# Patient Record
Sex: FEMALE | Race: WHITE | Employment: UNEMPLOYED | URBAN - METROPOLITAN AREA
[De-identification: names, ages, dates, MRNs, and addresses within clinical notes are randomized per-mention and may not be internally consistent; named-entity substitution may affect disease eponyms.]

---

## 2021-09-25 ENCOUNTER — HOSPITAL ENCOUNTER (EMERGENCY)
Facility: HOSPITAL | Age: 61
Discharge: HOME | End: 2021-09-25
Attending: EMERGENCY MEDICINE
Payer: MEDICAID

## 2021-09-25 ENCOUNTER — APPOINTMENT (OUTPATIENT)
Dept: CT IMAGING | Facility: HOSPITAL | Age: 61
End: 2021-09-25
Attending: EMERGENCY MEDICINE
Payer: MEDICAID

## 2021-09-25 VITALS
TEMPERATURE: 98 F | OXYGEN SATURATION: 95 % | DIASTOLIC BLOOD PRESSURE: 67 MMHG | WEIGHT: 178 LBS | RESPIRATION RATE: 18 BRPM | SYSTOLIC BLOOD PRESSURE: 118 MMHG | HEART RATE: 65 BPM

## 2021-09-25 DIAGNOSIS — K29.00 ACUTE GASTRITIS WITHOUT HEMORRHAGE, UNSPECIFIED GASTRITIS TYPE: Primary | ICD-10-CM

## 2021-09-25 DIAGNOSIS — K44.9 HIATAL HERNIA: ICD-10-CM

## 2021-09-25 LAB
ALBUMIN SERPL-MCNC: 3.9 G/DL (ref 3.4–5)
ALP LIVER SERPL-CCNC: 99 U/L
ALT SERPL-CCNC: 20 U/L
ANION GAP SERPL CALC-SCNC: 6 MMOL/L (ref 0–18)
AST SERPL-CCNC: 18 U/L (ref 15–37)
BASOPHILS # BLD AUTO: 0.02 X10(3) UL (ref 0–0.2)
BASOPHILS NFR BLD AUTO: 0.4 %
BILIRUB DIRECT SERPL-MCNC: 0.3 MG/DL (ref 0–0.2)
BILIRUB SERPL-MCNC: 1.1 MG/DL (ref 0.1–2)
BUN BLD-MCNC: 10 MG/DL (ref 7–18)
BUN/CREAT SERPL: 11.8 (ref 10–20)
CALCIUM BLD-MCNC: 9 MG/DL (ref 8.5–10.1)
CHLORIDE SERPL-SCNC: 113 MMOL/L (ref 98–112)
CO2 SERPL-SCNC: 27 MMOL/L (ref 21–32)
CREAT BLD-MCNC: 0.85 MG/DL
DEPRECATED RDW RBC AUTO: 46.5 FL (ref 35.1–46.3)
EOSINOPHIL # BLD AUTO: 0.01 X10(3) UL (ref 0–0.7)
EOSINOPHIL NFR BLD AUTO: 0.2 %
ERYTHROCYTE [DISTWIDTH] IN BLOOD BY AUTOMATED COUNT: 13.6 % (ref 11–15)
GLUCOSE BLD-MCNC: 143 MG/DL (ref 70–99)
HCT VFR BLD AUTO: 38 %
HGB BLD-MCNC: 12.6 G/DL
IMM GRANULOCYTES # BLD AUTO: 0.01 X10(3) UL (ref 0–1)
IMM GRANULOCYTES NFR BLD: 0.2 %
LIPASE SERPL-CCNC: 145 U/L (ref 73–393)
LYMPHOCYTES # BLD AUTO: 0.99 X10(3) UL (ref 1–4)
LYMPHOCYTES NFR BLD AUTO: 19.3 %
MCH RBC QN AUTO: 30.7 PG (ref 26–34)
MCHC RBC AUTO-ENTMCNC: 33.2 G/DL (ref 31–37)
MCV RBC AUTO: 92.5 FL
MONOCYTES # BLD AUTO: 0.23 X10(3) UL (ref 0.1–1)
MONOCYTES NFR BLD AUTO: 4.5 %
NEUTROPHILS # BLD AUTO: 3.87 X10 (3) UL (ref 1.5–7.7)
NEUTROPHILS # BLD AUTO: 3.87 X10(3) UL (ref 1.5–7.7)
NEUTROPHILS NFR BLD AUTO: 75.4 %
OSMOLALITY SERPL CALC.SUM OF ELEC: 304 MOSM/KG (ref 275–295)
PLATELET # BLD AUTO: 217 10(3)UL (ref 150–450)
POTASSIUM SERPL-SCNC: 3.8 MMOL/L (ref 3.5–5.1)
PROT SERPL-MCNC: 7.5 G/DL (ref 6.4–8.2)
RBC # BLD AUTO: 4.11 X10(6)UL
SODIUM SERPL-SCNC: 146 MMOL/L (ref 136–145)
WBC # BLD AUTO: 5.1 X10(3) UL (ref 4–11)

## 2021-09-25 PROCEDURE — 96376 TX/PRO/DX INJ SAME DRUG ADON: CPT

## 2021-09-25 PROCEDURE — 74177 CT ABD & PELVIS W/CONTRAST: CPT | Performed by: EMERGENCY MEDICINE

## 2021-09-25 PROCEDURE — 85025 COMPLETE CBC W/AUTO DIFF WBC: CPT | Performed by: EMERGENCY MEDICINE

## 2021-09-25 PROCEDURE — C9113 INJ PANTOPRAZOLE SODIUM, VIA: HCPCS | Performed by: EMERGENCY MEDICINE

## 2021-09-25 PROCEDURE — 96375 TX/PRO/DX INJ NEW DRUG ADDON: CPT

## 2021-09-25 PROCEDURE — 99285 EMERGENCY DEPT VISIT HI MDM: CPT

## 2021-09-25 PROCEDURE — 83690 ASSAY OF LIPASE: CPT | Performed by: EMERGENCY MEDICINE

## 2021-09-25 PROCEDURE — 96374 THER/PROPH/DIAG INJ IV PUSH: CPT

## 2021-09-25 PROCEDURE — 80076 HEPATIC FUNCTION PANEL: CPT | Performed by: EMERGENCY MEDICINE

## 2021-09-25 PROCEDURE — 96372 THER/PROPH/DIAG INJ SC/IM: CPT

## 2021-09-25 PROCEDURE — 80048 BASIC METABOLIC PNL TOTAL CA: CPT | Performed by: EMERGENCY MEDICINE

## 2021-09-25 RX ORDER — ONDANSETRON 2 MG/ML
INJECTION INTRAMUSCULAR; INTRAVENOUS
Status: COMPLETED
Start: 2021-09-25 | End: 2021-09-25

## 2021-09-25 RX ORDER — MORPHINE SULFATE 4 MG/ML
4 INJECTION, SOLUTION INTRAMUSCULAR; INTRAVENOUS ONCE
Status: COMPLETED | OUTPATIENT
Start: 2021-09-25 | End: 2021-09-25

## 2021-09-25 RX ORDER — ONDANSETRON 2 MG/ML
4 INJECTION INTRAMUSCULAR; INTRAVENOUS ONCE
Status: COMPLETED | OUTPATIENT
Start: 2021-09-25 | End: 2021-09-25

## 2021-09-25 RX ORDER — METOCLOPRAMIDE HYDROCHLORIDE 5 MG/ML
10 INJECTION INTRAMUSCULAR; INTRAVENOUS ONCE
Status: COMPLETED | OUTPATIENT
Start: 2021-09-25 | End: 2021-09-25

## 2021-09-25 RX ORDER — TRAMADOL HYDROCHLORIDE 50 MG/1
TABLET ORAL EVERY 6 HOURS PRN
Qty: 10 TABLET | Refills: 0 | Status: SHIPPED | OUTPATIENT
Start: 2021-09-25 | End: 2021-10-07

## 2021-09-25 RX ORDER — DICYCLOMINE HCL 20 MG
20 TABLET ORAL 4 TIMES DAILY PRN
Qty: 14 TABLET | Refills: 0 | Status: SHIPPED | OUTPATIENT
Start: 2021-09-25 | End: 2021-10-07

## 2021-09-25 RX ORDER — OMEPRAZOLE 20 MG/1
20 CAPSULE, DELAYED RELEASE ORAL
COMMUNITY

## 2021-09-25 RX ORDER — TRAMADOL HYDROCHLORIDE 50 MG/1
50 TABLET ORAL ONCE
Status: COMPLETED | OUTPATIENT
Start: 2021-09-25 | End: 2021-09-25

## 2021-09-25 RX ORDER — ONDANSETRON 4 MG/1
4 TABLET, ORALLY DISINTEGRATING ORAL EVERY 4 HOURS PRN
Qty: 10 TABLET | Refills: 0 | Status: SHIPPED | OUTPATIENT
Start: 2021-09-25 | End: 2021-10-07

## 2021-09-25 RX ORDER — PROMETHAZINE HYDROCHLORIDE 25 MG/1
25 SUPPOSITORY RECTAL EVERY 6 HOURS PRN
Qty: 20 SUPPOSITORY | Refills: 0 | Status: SHIPPED | OUTPATIENT
Start: 2021-09-25 | End: 2021-10-07

## 2021-09-25 RX ORDER — DICYCLOMINE HYDROCHLORIDE 10 MG/ML
20 INJECTION INTRAMUSCULAR ONCE
Status: COMPLETED | OUTPATIENT
Start: 2021-09-25 | End: 2021-09-25

## 2021-09-25 NOTE — ED INITIAL ASSESSMENT (HPI)
Pt arrived via EMS c/o abd pain + n/v, hx hiatal hernia. Pt states that she has hx hernia repair x10 years ago, and has upcoming appt with surgeon for evaluation for surgery because she feels mesh moving around.  EMS reported that pt had episode of vomiting

## 2021-09-25 NOTE — ED PROVIDER NOTES
Patient Seen in: [facility] Emergency Department    History   Patient presents with:  Abdomen/Flank Pain  Nausea/Vomiting/Diarrhea      HPI    79-year-old female presents the ER with complaint of nausea vomiting and abdominal pain.   Patient with a p and any equipment used during my examination was cleaned with super sani-cloth germicidal wipes following the exam.     Physical Exam  Vitals and nursing note reviewed. Constitutional:       Appearance: She is well-developed.    HENT:      Head: Normoceph within normal limits   LIPASE - Normal   CBC WITH DIFFERENTIAL WITH PLATELET    Narrative: The following orders were created for panel order CBC With Differential With Platelet.   Procedure                               Abnormality         Status vomiting. Patient abdomen soft nontender nondistended. Patient blood work within normal limits.   Patient discharged home with Zofran instructed to follow-up with her cardiothoracic surgeon as scheduled    Condition upon leaving the department: Stable

## 2021-09-25 NOTE — ED PROVIDER NOTES
Patient began having vomiting and pain after discharge. She is now doing better after Reglan, Zofran, Protonix, morphine. CT was completed and shows large hiatal hernia with sequelae as well as the displaced mesh that patient has already known about.   Pa

## 2021-09-27 ENCOUNTER — APPOINTMENT (OUTPATIENT)
Dept: GENERAL RADIOLOGY | Facility: HOSPITAL | Age: 61
DRG: 326 | End: 2021-09-27
Attending: THORACIC SURGERY (CARDIOTHORACIC VASCULAR SURGERY)
Payer: MEDICAID

## 2021-09-27 ENCOUNTER — HOSPITAL ENCOUNTER (INPATIENT)
Facility: HOSPITAL | Age: 61
LOS: 10 days | Discharge: HOME | DRG: 326 | End: 2021-10-07
Attending: EMERGENCY MEDICINE | Admitting: HOSPITALIST
Payer: MEDICAID

## 2021-09-27 ENCOUNTER — APPOINTMENT (OUTPATIENT)
Dept: CT IMAGING | Facility: HOSPITAL | Age: 61
DRG: 326 | End: 2021-09-27
Attending: EMERGENCY MEDICINE
Payer: MEDICAID

## 2021-09-27 ENCOUNTER — ANESTHESIA EVENT (OUTPATIENT)
Dept: SURGERY | Facility: HOSPITAL | Age: 61
DRG: 326 | End: 2021-09-27
Payer: MEDICAID

## 2021-09-27 ENCOUNTER — ANESTHESIA (OUTPATIENT)
Dept: SURGERY | Facility: HOSPITAL | Age: 61
DRG: 326 | End: 2021-09-27
Payer: MEDICAID

## 2021-09-27 DIAGNOSIS — K31.89 GASTRIC VOLVULUS: Primary | ICD-10-CM

## 2021-09-27 DIAGNOSIS — K22.3 PERFORATION ESOPHAGUS: ICD-10-CM

## 2021-09-27 PROBLEM — R79.89 AZOTEMIA: Status: ACTIVE | Noted: 2021-09-27

## 2021-09-27 PROCEDURE — 0WQF0ZZ REPAIR ABDOMINAL WALL, OPEN APPROACH: ICD-10-PCS | Performed by: SURGERY

## 2021-09-27 PROCEDURE — 71260 CT THORAX DX C+: CPT | Performed by: EMERGENCY MEDICINE

## 2021-09-27 PROCEDURE — 99254 IP/OBS CNSLTJ NEW/EST MOD 60: CPT | Performed by: SURGERY

## 2021-09-27 PROCEDURE — 0DU607Z SUPPLEMENT STOMACH WITH AUTOLOGOUS TISSUE SUBSTITUTE, OPEN APPROACH: ICD-10-PCS | Performed by: SURGERY

## 2021-09-27 PROCEDURE — 0DN60ZZ RELEASE STOMACH, OPEN APPROACH: ICD-10-PCS | Performed by: SURGERY

## 2021-09-27 PROCEDURE — 49561 REPAIR INCISIONAL HERNIA,STRANG: CPT | Performed by: SURGERY

## 2021-09-27 PROCEDURE — 0W9G0ZZ DRAINAGE OF PERITONEAL CAVITY, OPEN APPROACH: ICD-10-PCS | Performed by: SURGERY

## 2021-09-27 PROCEDURE — 0DHA0UZ INSERTION OF FEEDING DEVICE INTO JEJUNUM, OPEN APPROACH: ICD-10-PCS | Performed by: SURGERY

## 2021-09-27 PROCEDURE — 43830 GSTRST OPEN WO CONSTJ TUBE: CPT | Performed by: SURGERY

## 2021-09-27 PROCEDURE — 0D9600Z DRAINAGE OF STOMACH WITH DRAINAGE DEVICE, OPEN APPROACH: ICD-10-PCS | Performed by: SURGERY

## 2021-09-27 PROCEDURE — 99223 1ST HOSP IP/OBS HIGH 75: CPT | Performed by: HOSPITALIST

## 2021-09-27 PROCEDURE — 0DB60ZZ EXCISION OF STOMACH, OPEN APPROACH: ICD-10-PCS | Performed by: SURGERY

## 2021-09-27 PROCEDURE — 71045 X-RAY EXAM CHEST 1 VIEW: CPT | Performed by: THORACIC SURGERY (CARDIOTHORACIC VASCULAR SURGERY)

## 2021-09-27 PROCEDURE — 0DJ08ZZ INSPECTION OF UPPER INTESTINAL TRACT, VIA NATURAL OR ARTIFICIAL OPENING ENDOSCOPIC: ICD-10-PCS | Performed by: THORACIC SURGERY (CARDIOTHORACIC VASCULAR SURGERY)

## 2021-09-27 PROCEDURE — 0BQT0ZZ REPAIR DIAPHRAGM, OPEN APPROACH: ICD-10-PCS | Performed by: THORACIC SURGERY (CARDIOTHORACIC VASCULAR SURGERY)

## 2021-09-27 PROCEDURE — 0WB80ZZ EXCISION OF CHEST WALL, OPEN APPROACH: ICD-10-PCS | Performed by: THORACIC SURGERY (CARDIOTHORACIC VASCULAR SURGERY)

## 2021-09-27 PROCEDURE — 74160 CT ABDOMEN W/CONTRAST: CPT | Performed by: EMERGENCY MEDICINE

## 2021-09-27 RX ORDER — MORPHINE SULFATE 10 MG/ML
6 INJECTION, SOLUTION INTRAMUSCULAR; INTRAVENOUS EVERY 10 MIN PRN
Status: DISCONTINUED | OUTPATIENT
Start: 2021-09-27 | End: 2021-09-27 | Stop reason: HOSPADM

## 2021-09-27 RX ORDER — MORPHINE SULFATE 4 MG/ML
2 INJECTION, SOLUTION INTRAMUSCULAR; INTRAVENOUS EVERY 10 MIN PRN
Status: DISCONTINUED | OUTPATIENT
Start: 2021-09-27 | End: 2021-09-27 | Stop reason: HOSPADM

## 2021-09-27 RX ORDER — MORPHINE SULFATE 4 MG/ML
4 INJECTION, SOLUTION INTRAMUSCULAR; INTRAVENOUS EVERY 10 MIN PRN
Status: DISCONTINUED | OUTPATIENT
Start: 2021-09-27 | End: 2021-09-27 | Stop reason: HOSPADM

## 2021-09-27 RX ORDER — PROCHLORPERAZINE EDISYLATE 5 MG/ML
5 INJECTION INTRAMUSCULAR; INTRAVENOUS EVERY 8 HOURS PRN
Status: DISCONTINUED | OUTPATIENT
Start: 2021-09-27 | End: 2021-10-01

## 2021-09-27 RX ORDER — LIDOCAINE HYDROCHLORIDE 10 MG/ML
INJECTION, SOLUTION EPIDURAL; INFILTRATION; INTRACAUDAL; PERINEURAL AS NEEDED
Status: DISCONTINUED | OUTPATIENT
Start: 2021-09-27 | End: 2021-09-27 | Stop reason: SURG

## 2021-09-27 RX ORDER — ONDANSETRON 2 MG/ML
4 INJECTION INTRAMUSCULAR; INTRAVENOUS ONCE
Status: COMPLETED | OUTPATIENT
Start: 2021-09-27 | End: 2021-09-27

## 2021-09-27 RX ORDER — MORPHINE SULFATE 4 MG/ML
4 INJECTION, SOLUTION INTRAMUSCULAR; INTRAVENOUS EVERY 2 HOUR PRN
Status: DISCONTINUED | OUTPATIENT
Start: 2021-09-27 | End: 2021-10-07

## 2021-09-27 RX ORDER — MORPHINE SULFATE 4 MG/ML
4 INJECTION, SOLUTION INTRAMUSCULAR; INTRAVENOUS EVERY 2 HOUR PRN
Status: DISCONTINUED | OUTPATIENT
Start: 2021-09-27 | End: 2021-10-04

## 2021-09-27 RX ORDER — ROCURONIUM BROMIDE 10 MG/ML
INJECTION, SOLUTION INTRAVENOUS AS NEEDED
Status: DISCONTINUED | OUTPATIENT
Start: 2021-09-27 | End: 2021-09-27 | Stop reason: SURG

## 2021-09-27 RX ORDER — CALCIUM GLUCONATE 94 MG/ML
INJECTION, SOLUTION INTRAVENOUS AS NEEDED
Status: DISCONTINUED | OUTPATIENT
Start: 2021-09-27 | End: 2021-09-27 | Stop reason: SURG

## 2021-09-27 RX ORDER — ONDANSETRON 2 MG/ML
4 INJECTION INTRAMUSCULAR; INTRAVENOUS EVERY 6 HOURS PRN
Status: DISCONTINUED | OUTPATIENT
Start: 2021-09-27 | End: 2021-10-07

## 2021-09-27 RX ORDER — ONDANSETRON 2 MG/ML
4 INJECTION INTRAMUSCULAR; INTRAVENOUS ONCE AS NEEDED
Status: DISCONTINUED | OUTPATIENT
Start: 2021-09-27 | End: 2021-09-27 | Stop reason: HOSPADM

## 2021-09-27 RX ORDER — HYDROMORPHONE HYDROCHLORIDE 1 MG/ML
1 INJECTION, SOLUTION INTRAMUSCULAR; INTRAVENOUS; SUBCUTANEOUS ONCE
Status: COMPLETED | OUTPATIENT
Start: 2021-09-27 | End: 2021-09-27

## 2021-09-27 RX ORDER — HYDROMORPHONE HYDROCHLORIDE 1 MG/ML
0.4 INJECTION, SOLUTION INTRAMUSCULAR; INTRAVENOUS; SUBCUTANEOUS EVERY 5 MIN PRN
Status: DISCONTINUED | OUTPATIENT
Start: 2021-09-27 | End: 2021-09-27 | Stop reason: HOSPADM

## 2021-09-27 RX ORDER — HYDROCODONE BITARTRATE AND ACETAMINOPHEN 5; 325 MG/1; MG/1
1 TABLET ORAL AS NEEDED
Status: DISCONTINUED | OUTPATIENT
Start: 2021-09-27 | End: 2021-09-27 | Stop reason: HOSPADM

## 2021-09-27 RX ORDER — MORPHINE SULFATE 4 MG/ML
2 INJECTION, SOLUTION INTRAMUSCULAR; INTRAVENOUS EVERY 2 HOUR PRN
Status: DISCONTINUED | OUTPATIENT
Start: 2021-09-27 | End: 2021-10-07

## 2021-09-27 RX ORDER — MORPHINE SULFATE 2 MG/ML
2 INJECTION, SOLUTION INTRAMUSCULAR; INTRAVENOUS EVERY 2 HOUR PRN
Status: DISCONTINUED | OUTPATIENT
Start: 2021-09-27 | End: 2021-10-04

## 2021-09-27 RX ORDER — MORPHINE SULFATE 4 MG/ML
4 INJECTION, SOLUTION INTRAMUSCULAR; INTRAVENOUS ONCE
Status: COMPLETED | OUTPATIENT
Start: 2021-09-27 | End: 2021-09-27

## 2021-09-27 RX ORDER — SODIUM CHLORIDE 9 MG/ML
INJECTION, SOLUTION INTRAVENOUS CONTINUOUS PRN
Status: DISCONTINUED | OUTPATIENT
Start: 2021-09-27 | End: 2021-09-27 | Stop reason: SURG

## 2021-09-27 RX ORDER — ONDANSETRON 2 MG/ML
INJECTION INTRAMUSCULAR; INTRAVENOUS AS NEEDED
Status: DISCONTINUED | OUTPATIENT
Start: 2021-09-27 | End: 2021-09-27 | Stop reason: SURG

## 2021-09-27 RX ORDER — MENTHOL 5.8 MG/1
1 LOZENGE ORAL DAILY
COMMUNITY
End: 2021-10-07

## 2021-09-27 RX ORDER — NALOXONE HYDROCHLORIDE 0.4 MG/ML
80 INJECTION, SOLUTION INTRAMUSCULAR; INTRAVENOUS; SUBCUTANEOUS AS NEEDED
Status: DISCONTINUED | OUTPATIENT
Start: 2021-09-27 | End: 2021-09-27 | Stop reason: HOSPADM

## 2021-09-27 RX ORDER — MORPHINE SULFATE 4 MG/ML
6 INJECTION, SOLUTION INTRAMUSCULAR; INTRAVENOUS EVERY 2 HOUR PRN
Status: DISCONTINUED | OUTPATIENT
Start: 2021-09-27 | End: 2021-10-07

## 2021-09-27 RX ORDER — HALOPERIDOL 5 MG/ML
0.25 INJECTION INTRAMUSCULAR ONCE AS NEEDED
Status: DISCONTINUED | OUTPATIENT
Start: 2021-09-27 | End: 2021-09-27 | Stop reason: HOSPADM

## 2021-09-27 RX ORDER — MORPHINE SULFATE 2 MG/ML
1 INJECTION, SOLUTION INTRAMUSCULAR; INTRAVENOUS EVERY 2 HOUR PRN
Status: DISCONTINUED | OUTPATIENT
Start: 2021-09-27 | End: 2021-10-04

## 2021-09-27 RX ORDER — PHENYLEPHRINE HCL 10 MG/ML
VIAL (ML) INJECTION AS NEEDED
Status: DISCONTINUED | OUTPATIENT
Start: 2021-09-27 | End: 2021-09-27 | Stop reason: SURG

## 2021-09-27 RX ORDER — HYDROMORPHONE HYDROCHLORIDE 1 MG/ML
0.2 INJECTION, SOLUTION INTRAMUSCULAR; INTRAVENOUS; SUBCUTANEOUS EVERY 5 MIN PRN
Status: DISCONTINUED | OUTPATIENT
Start: 2021-09-27 | End: 2021-09-27 | Stop reason: HOSPADM

## 2021-09-27 RX ORDER — SODIUM CHLORIDE, SODIUM LACTATE, POTASSIUM CHLORIDE, CALCIUM CHLORIDE 600; 310; 30; 20 MG/100ML; MG/100ML; MG/100ML; MG/100ML
INJECTION, SOLUTION INTRAVENOUS CONTINUOUS
Status: DISCONTINUED | OUTPATIENT
Start: 2021-09-27 | End: 2021-09-27 | Stop reason: HOSPADM

## 2021-09-27 RX ORDER — HYDROCODONE BITARTRATE AND ACETAMINOPHEN 5; 325 MG/1; MG/1
2 TABLET ORAL AS NEEDED
Status: DISCONTINUED | OUTPATIENT
Start: 2021-09-27 | End: 2021-09-27 | Stop reason: HOSPADM

## 2021-09-27 RX ORDER — DEXAMETHASONE SODIUM PHOSPHATE 4 MG/ML
VIAL (ML) INJECTION AS NEEDED
Status: DISCONTINUED | OUTPATIENT
Start: 2021-09-27 | End: 2021-09-27 | Stop reason: SURG

## 2021-09-27 RX ORDER — SODIUM CHLORIDE, SODIUM LACTATE, POTASSIUM CHLORIDE, CALCIUM CHLORIDE 600; 310; 30; 20 MG/100ML; MG/100ML; MG/100ML; MG/100ML
INJECTION, SOLUTION INTRAVENOUS CONTINUOUS
Status: DISCONTINUED | OUTPATIENT
Start: 2021-09-27 | End: 2021-10-04

## 2021-09-27 RX ORDER — HYDROMORPHONE HYDROCHLORIDE 1 MG/ML
0.6 INJECTION, SOLUTION INTRAMUSCULAR; INTRAVENOUS; SUBCUTANEOUS EVERY 5 MIN PRN
Status: DISCONTINUED | OUTPATIENT
Start: 2021-09-27 | End: 2021-09-27 | Stop reason: HOSPADM

## 2021-09-27 RX ORDER — PROCHLORPERAZINE EDISYLATE 5 MG/ML
5 INJECTION INTRAMUSCULAR; INTRAVENOUS ONCE AS NEEDED
Status: DISCONTINUED | OUTPATIENT
Start: 2021-09-27 | End: 2021-09-27 | Stop reason: HOSPADM

## 2021-09-27 RX ADMIN — LIDOCAINE HYDROCHLORIDE 50 MG: 10 INJECTION, SOLUTION EPIDURAL; INFILTRATION; INTRACAUDAL; PERINEURAL at 19:51:00

## 2021-09-27 RX ADMIN — SODIUM CHLORIDE, SODIUM LACTATE, POTASSIUM CHLORIDE, CALCIUM CHLORIDE: 600; 310; 30; 20 INJECTION, SOLUTION INTRAVENOUS at 17:11:00

## 2021-09-27 RX ADMIN — CALCIUM GLUCONATE 1 G: 94 INJECTION, SOLUTION INTRAVENOUS at 19:09:00

## 2021-09-27 RX ADMIN — LIDOCAINE HYDROCHLORIDE 50 MG: 10 INJECTION, SOLUTION EPIDURAL; INFILTRATION; INTRACAUDAL; PERINEURAL at 16:31:00

## 2021-09-27 RX ADMIN — SODIUM CHLORIDE: 9 INJECTION, SOLUTION INTRAVENOUS at 18:20:00

## 2021-09-27 RX ADMIN — SODIUM CHLORIDE: 9 INJECTION, SOLUTION INTRAVENOUS at 19:55:00

## 2021-09-27 RX ADMIN — DEXAMETHASONE SODIUM PHOSPHATE 8 MG: 4 MG/ML VIAL (ML) INJECTION at 16:42:00

## 2021-09-27 RX ADMIN — SODIUM CHLORIDE: 9 INJECTION, SOLUTION INTRAVENOUS at 16:55:00

## 2021-09-27 RX ADMIN — SODIUM CHLORIDE: 9 INJECTION, SOLUTION INTRAVENOUS at 17:56:00

## 2021-09-27 RX ADMIN — ROCURONIUM BROMIDE 100 MG: 10 INJECTION, SOLUTION INTRAVENOUS at 16:31:00

## 2021-09-27 RX ADMIN — CALCIUM GLUCONATE 1 G: 94 INJECTION, SOLUTION INTRAVENOUS at 19:49:00

## 2021-09-27 RX ADMIN — SODIUM CHLORIDE, SODIUM LACTATE, POTASSIUM CHLORIDE, CALCIUM CHLORIDE: 600; 310; 30; 20 INJECTION, SOLUTION INTRAVENOUS at 18:31:00

## 2021-09-27 RX ADMIN — PHENYLEPHRINE HCL 200 MCG: 10 MG/ML VIAL (ML) INJECTION at 16:35:00

## 2021-09-27 RX ADMIN — CALCIUM GLUCONATE 1 G: 94 INJECTION, SOLUTION INTRAVENOUS at 16:42:00

## 2021-09-27 RX ADMIN — SODIUM CHLORIDE: 9 INJECTION, SOLUTION INTRAVENOUS at 16:44:00

## 2021-09-27 RX ADMIN — PHENYLEPHRINE HCL 200 MCG: 10 MG/ML VIAL (ML) INJECTION at 16:42:00

## 2021-09-27 RX ADMIN — ONDANSETRON 4 MG: 2 INJECTION INTRAMUSCULAR; INTRAVENOUS at 19:37:00

## 2021-09-27 RX ADMIN — ROCURONIUM BROMIDE 20 MG: 10 INJECTION, SOLUTION INTRAVENOUS at 18:22:00

## 2021-09-27 NOTE — ANESTHESIA PROCEDURE NOTES
Airway  Date/Time: 9/27/2021 4:34 PM  Urgency: Elective    Airway not difficult    General Information and Staff    Patient location during procedure: OR  Anesthesiologist: Teto Kaur MD  Performed: anesthesiologist     Indications and Patient Conditi

## 2021-09-27 NOTE — PROGRESS NOTES
General surgery consultation    Chart is reviewed    I was called by the emergency room to evaluate a 69-year-old female who was seen 2 days ago with history of large paraesophageal hernia.   She was discharged home returns now with midepigastric pain and i

## 2021-09-27 NOTE — ANESTHESIA PREPROCEDURE EVALUATION
Anesthesia PreOp Note    HPI:     Carolynn Coburn is a 57year old female who presents for preoperative consultation requested by: Shubham Sierra MD    Date of Surgery: 9/27/2021    Procedure(s):  EXPLORATORY LAPAROTOMY  THORACOTOMY  Indication: bowel o mg, Intravenous, Q2H PRN, Taylor Hanks MD   Or  morphINE sulfate (PF) 2 MG/ML injection 2 mg, 2 mg, Intravenous, Q2H PRN, Taylor Hanks MD   Or  morphINE sulfate (PF) 4 MG/ML injection 4 mg, 4 mg, Intravenous, Q2H PRN, MD Deep Wright file      Frequency of Social Gatherings with Friends and Family: Not on file      Attends Jehovah's witness Services: Not on file      Active Member of Clubs or Organizations: Not on file      Attends Club or Organization Meetings: Not on file      Marital Status Pulmonary - negative ROS and normal exam    breath sounds clear to auscultation  (-) asthma, shortness of breath, recent URI, sleep apnea  Cardiovascular - negative ROS and normal exam  Exercise tolerance: good  (-) pacemaker, hypertension, valvular proble

## 2021-09-27 NOTE — ANESTHESIA PROCEDURE NOTES
Peripheral IV  Date/Time: 9/27/2021 4:38 PM  Inserted by: Daniel Wynne MD    Placement  Needle size: 16 G  Laterality: right  Location: forearm  Site prep: alcohol  Technique: anatomical landmarks  Attempts: 1

## 2021-09-27 NOTE — CONSULTS
[facility]    PATIENT'S NAME: Jodie Nickerson [facility]   ATTENDING PHYSICIAN: Sterling Farrell MD   CONSULTING PHYSICIAN: Justin Garcia MD   PATIENT ACCOUNT#:   [id number]    LOCATION:  [address]  MEDICAL RECORD #:   [id number]       DATE OF Tenderness in the epigastrium. No diffuse peritonitis. EXTREMITIES:  Without lymphedema. LABORATORY DATA:  Laboratory values and CT scans reviewed. 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     PLAN:  Exploratory laparo

## 2021-09-27 NOTE — PROGRESS NOTES
Pt admitted to room from ED. Oriented to room and call light use. Pain upon moving from cart to bed, then resting comfortably in bed. On 4L NC. Ambulating to bathroom with staff, voided. IVF infusing. Plan for surgery this evening.  at bedside.  Cathryn Ashford

## 2021-09-27 NOTE — CONSULTS
Thoracic Surgery Consult      Name: Shi Monday   Age: 57year old   Sex: female. MRN: [id number]    Reason for Consultation: periesophageal hernia with volvulus    Consulting Physician: Ra Palomares    Subjective:      Chief Complaint: \ as needed for Nausea., Disp: 10 tablet, Rfl: 0  •  traMADol 50 MG Oral Tab, Take 1-2 tablets ( mg total) by mouth every 6 (six) hours as needed for Pain., Disp: 10 tablet, Rfl: 0  •  dicyclomine 20 MG Oral Tab, Take 1 tablet (20 mg total) by mouth 4 .0 09/27/2021 09:53 AM    MCV 91.2 09/27/2021 09:53 AM     Lab Results   Component Value Date/Time     09/27/2021 09:53 AM    K 3.8 09/27/2021 09:53 AM     09/27/2021 09:53 AM    CO2 26.0 09/27/2021 09:53 AM    BUN 26 (H) 09/27/2021 09:5 located adjacent to   the left lateral margin of the paraesophageal hiatal hernia component; unfavorable interval change with progressive distention of the paraesophageal hiatal hernia component; organoaxial malrotation of the stomach is again identified a volvulus/perforation.  Specifically, there has been progressive distention of the paraesophageal gastric component with multiple new foci of free intraperitoneal air adjacent to the distal esophagus and throughout the upper abdomen. Ervin Dial is also small free intraperitoneal air adjacent to distal esophagus and throughout upper abdomen. Patient previously had hiatal hernia repair 10 years ago. Since last labor day, she noted abdominal pain and vomiting with solid foods and occasionally liquids.  She was e setting of recurrent hiatal hernia and gastric volvulus. Ms. Rosita Espino understands and agrees to proceed.     Prabhjot Stephen MD  Thoracic Surgery  Pager [phone]

## 2021-09-27 NOTE — H&P
[facility]    PATIENT'S NAME: [facility]   ATTENDING PHYSICIAN: Shantell Fraser MD   PATIENT ACCOUNT#:   [id number]    LOCATION:  [place] [address]  MEDICAL RECORD #:   [id number]       YOB: 1960  ADMISSION DATE:       09/27 fluids, Protonix, IV Zosyn. She will be admitted to the hospital for further management. PAST MEDICAL HISTORY:  Sliding periesophageal hernia, status post surgical repair a long time ago, recent worsening of her hernia.   She has been evaluated by Abel Shaikh possible distal esophageal perforation and free air in the abdomen and underlying migration of an old mesh. The patient will be admitted to surgical floor.   IV fluids, n.p.o., monitor her hemodynamic status, IV antibiotics and Protonix, obtain general arya

## 2021-09-27 NOTE — ED QUICK NOTES
Orders for admission, patient is aware of plan and ready to go upstairs. Any questions, please call ED KINA Siddiqi  at extension [phone].    Type of COVID test sent: Rapid  COVID Suspicion level: Low    Titratable drug(s) infusin.9 NS  Rate:    LOC at time o

## 2021-09-27 NOTE — ANESTHESIA PROCEDURE NOTES
Arterial Line  Performed by: Pedro Deleno MD  Authorized by: Pedro Deleon MD     General Information and Staff    Procedure Start:  9/27/2021 4:28 PM  Procedure End:  9/27/2021 4:32 PM  Anesthesiologist:  Pedro Deleon MD  Performed By:  Brenden Wadsworth

## 2021-09-27 NOTE — ED INITIAL ASSESSMENT (HPI)
Izzy Farfan is here for evaluation of abdominal pain, nausea, vomiting, and inability to tolerate fluids. Has hiatal hernia and is scheduled for surgery in [place].

## 2021-09-28 ENCOUNTER — APPOINTMENT (OUTPATIENT)
Dept: GENERAL RADIOLOGY | Facility: HOSPITAL | Age: 61
DRG: 326 | End: 2021-09-28
Attending: THORACIC SURGERY (CARDIOTHORACIC VASCULAR SURGERY)
Payer: MEDICAID

## 2021-09-28 PROCEDURE — 71045 X-RAY EXAM CHEST 1 VIEW: CPT | Performed by: THORACIC SURGERY (CARDIOTHORACIC VASCULAR SURGERY)

## 2021-09-28 PROCEDURE — 99255 IP/OBS CONSLTJ NEW/EST HI 80: CPT | Performed by: INTERNAL MEDICINE

## 2021-09-28 PROCEDURE — 99233 SBSQ HOSP IP/OBS HIGH 50: CPT | Performed by: HOSPITALIST

## 2021-09-28 RX ORDER — HEPARIN SODIUM 5000 [USP'U]/ML
5000 INJECTION, SOLUTION INTRAVENOUS; SUBCUTANEOUS EVERY 12 HOURS
Status: DISCONTINUED | OUTPATIENT
Start: 2021-09-28 | End: 2021-10-07

## 2021-09-28 NOTE — ED PROVIDER NOTES
Patient Seen in: [facility] ER      History   Patient presents with:  Abdomen/Flank Pain    Stated Complaint: abd pain hx hernia -- seen here fri    Subjective:   HPI    57year old female with known hiatal hernia who has acutely worsening abd brenda not toxic-appearing or diaphoretic. HENT:      Head: Normocephalic and atraumatic. Eyes:      Conjunctiva/sclera: Conjunctivae normal.      Pupils: Pupils are equal, round, and reactive to light.    Cardiovascular:      Rate and Rhythm: Normal rate and for the following components:    ISTAT Sodium 147 (*)     ISTAT Hematocrit 31 (*)     ISTAT Glucose 136 (*)     ISTAT Blood Gas pH 7.29 (*)     ISTAT Blood Gas TCO2 21 (*)     ISTAT Blood Gas HCO3 19.7 (*)     All other components within normal limits   CB CHEST+ABDOMEN (ALL CNTRST ONLY) (CPT=71260/29094)    Result Date: 9/27/2021  CONCLUSION:  1. Large mixed paraesophageal and sliding type hiatal hernia is again identified with organoaxial malrotation of the stomach.   There has been unfavorable interval juana 9/27/2021 at 10:19 PM     Finalized by (CST): Guru Andersen MD on 9/27/2021 at 10:21 PM            Radiology exams  Viewed and reviewed by myself and findings discussed with patient including need for follow up    Critical Care:  I spent a total of 30 minute 1010)   iopamidol (ISOVUE-M) 76 % injection 100 mL (80 mL Intravenous Given 9/27/21 1059)   sodium chloride 0.9% IV bolus 1,500 mL (1,500 mL Intravenous Handoff 9/27/21 1358)   Piperacillin Sod-Tazobactam So (ZOSYN) 3.375 g in dextrose 5% 100 mL IVPB-ADDV

## 2021-09-28 NOTE — BRIEF OP NOTE
Pre-Operative Diagnosis: GASTRIC VOLVULUS     Post-Operative Diagnosis: GASTRIC VOLVULUS, necrotic fundus and cardia, extensive matted adhesions      Procedure Performed:   EXPLORATORY LAPAROTOMY, extensive lysis of matted dense adhesions modifier 22, part

## 2021-09-28 NOTE — OPERATIVE REPORT
[facility]    PATIENT'S NAME: Bowen GARCIA   ATTENDING PHYSICIAN: Kevin Coburn MD   OPERATING PHYSICIAN: Lauren Thompson MD   PATIENT ACCOUNT#:   [id number]    LOCATION:  [address] [place] [address] #:   [id number]       DATE OF exact location. I did perform an esophagoscopy and confirmed that there was no perforation of her esophagus-- the perforation was approximately 5 to 7 cm onto the stomach past the GE junction.   The stomach was stuck up in the chest with a shortened esopha ribs and through the costal cartilages and then through the interspace. I extended the thoracotomy posteriorly. A Finochietto retractor was placed.   I then began to take down the diaphragm leaving a cuff of diaphragm along the edge for later reconstructi By Janeth Leahy MD  d: 09/27/2021 20:13:09  t: 09/28/2021 02:55:07  [facility] [id number]  BASIL/

## 2021-09-28 NOTE — PLAN OF CARE
Problem: Patient Centered Care  Goal: Patient preferences are identified and integrated in the patient's plan of care  Description: Interventions:  - What would you like us to know as we care for you?  Lives in [place] and came in [place] for a visit therapy as ordered  Outcome: Progressing     Problem: GASTROINTESTINAL - ADULT  Goal: Minimal or absence of nausea and vomiting  Description: INTERVENTIONS:  - Maintain adequate hydration with IV or PO as ordered and tolerated  - Nasogastric tube to low in renal function maintained  Description: INTERVENTIONS:  - Monitor labs and assess for signs and symptoms of volume excess or deficit  - Monitor intake, output and patient weight  - Monitor urine specific gravity, serum osmolarity and serum sodium as indica for shaving  - Use soft bristle tooth brush  - Limit straining and forceful nose blowing  Outcome: Progressing     Problem: Patient/Family Goals  Goal: Patient/Family Long Term Goal  Description: Patient's Long Term Goal: to be able to go back home in [facility] Term Goal: to be able to go back home in [place]    Interventions:  - monitor vital signs  -strictly measure intake and output  -give pain medicine to control pain as needed  - See additional Care Plan goals for specific interventions  Outcome: Not Prog patient after the bolus and only came up the BP in 90 systolic and stay in low side after few minutes and started on a low dose of levophed per Dr. Candi Calabrese order and read back by phone and carried out. VS kept monitored .  Then later she desat on room air

## 2021-09-28 NOTE — PROGRESS NOTES
[facility]    Progress Note    Sravan Zuluaga Patient Status:  Inpatient    1960 MRN [id number]   Location [facility] 2W/SW Attending Dafne Lui MD   Hosp Day # 1 PCP PHYSICIAN NONSTAFF       Subjective:   Janette Gaytan (TTW=[id number])    Result Date: 9/27/2021  CONCLUSION:  1. Large mixed paraesophageal and sliding type hiatal hernia is again identified with organoaxial malrotation of the stomach.   There has been unfavorable interval change since CT examination of 2 da Efe Moran MD on 9/28/2021 at 7:25 AM     Finalized by (CST): Michell Mccoy MD on 9/28/2021 at 7:28 AM          XR CHEST AP PORTABLE  (CPT=71045)    Result Date: 9/27/2021  CONCLUSION:   Postoperative changes of left thoracotomy with left chest tube in p

## 2021-09-28 NOTE — CONSULTS
[facility]    Report of Consultation    Shi Monday Patient Status:  Inpatient    1960 MRN [id number]   Location [facility] 2W/SW Attending Aliyah Myers MD   Hosp Day # 1 PCP PHYSICIAN NONSTAFF     Date of Admissio Use: Never used    Alcohol use: Yes      Comment: occ. Drug use: Never    Allergies/Medications: Allergies: No Known Allergies  Multiple Vitamins-Iron (QC DAILY MULTIVITAMINS/IRON) Oral Tab, Take 1 tablet by mouth daily.   omeprazole 20 MG Oral Capsule or rebound  Postsurgical changes   Lymphadenopathy:     She has no cervical adenopathy. Neurological: She is oriented to person, place, and time. No sensory deficit or motor deficit. Skin: Skin is dry.    Psychiatric: Her behavior is normal.       Resul There is a retroaortic left renal vein. 6. Partially imaged bilateral breast implants. 7. Chronic-appearing moderate to severe T8 as well as mild to moderate T7 and L1 vertebral body compression fractures. 8. Lesser incidental findings as above.    Results staff   D/w pt and family             Galina Vencesin.  Brigid Rucekr MD  9/28/2021

## 2021-09-28 NOTE — DIETARY NOTE
ADULT NUTRITION INITIAL ASSESSMENT    Pt is at high nutrition risk. Pt does not meet malnutrition criteria. RECOMMENDATIONS TO MD:  Will follow for enteral nutriton start, po intake future start, education as appropriate.      ADMITTING DIAGNOSIS: Intake: NPO  Intake Meeting Needs: No but J-tube feeds to start soon.    Percent Meals Eaten (last 3 days)     Date/Time Percent Meals Eaten (%)    09/28/21 0000 0 %    09/28/21 0534 0 %         Food Allergies: No Known Food Allergies (NKFA)  Cultural/Eth ESTIMATED NUTRITION NEEDS: Dosing wt: 80  kg  Calories: 2518-7740  calories/day (20-30calories per kg Actual body wt (ABW))  Protein:   grams protein/day (1.5-2.0 grams protein per kg Ideal body wt (IBW))  Fluid needs: ~2400 ml (30 ml/kg) + losse

## 2021-09-28 NOTE — OPERATIVE REPORT
[facility]    PATIENT'S NAME: Josefa Meléndez JOSE   ATTENDING PHYSICIAN: Markus Arita MD   OPERATING PHYSICIAN: Pepe Silva MD   PATIENT ACCOUNT#:   [id number]    LOCATION:  [address] [place] [address] RECORD #:   [id number]       DATE OF BIRTH: fully mobilized and there is a large perforation along the fundus. The esophagus does not appear to be involved. I elected to resect a portion of the fundus and cardia using serial application of the DEAN stapler.   We made careful observation not to encro

## 2021-09-28 NOTE — PROGRESS NOTES
[facility]    Progress Note    Gerda Codding Patient Status:  Inpatient    1960 MRN [id number]   Location [facility] 2W/SW Attending Emmie Ceron MD   Hosp Day # 1 PCP PHYSICIAN NONSTAFF     Assessment and Plan: 82 14 95 % — —   09/28/21 0450 98/73 — — 85 15 96 % — —   09/28/21 0440 112/81 — — 90 20 94 % — —   09/28/21 0430 116/83 — — 89 26 91 % — —   09/28/21 0420 113/78 — — 83 14 94 % — —   09/28/21 0410 99/78 — — 83 14 96 % — —   09/28/21 0400 107/69 98.2 °F (3 09/27/21 2109 111/87 — — 88 15 98 % — —   09/27/21 2059 (!) 116/92 — — 86 16 97 % — —   09/27/21 2049 107/90 — — 80 13 96 % — —   09/27/21 2039 (!) 113/92 — — 78 17 96 % — —   09/27/21 2029 (!) 118/99 — — 75 17 93 % — —   09/27/21 2024 — — — — — 96 % — — --    Output (mL) (Gastrostomy/Enterostomy Gastrostomy 1 18 Fr.) -- 60 --    Output (mL) (NG/OG Tube Nasogastric Left nostril) -- 0 --    Drains  --  225  --    Output (mL) (Closed/Suction Drain 1 Abdomen Bulb ) -- 225 --    Chest Tube  --  362  140    Out history of prior/remote hiatal hernia repair, this finding could relate to displaced mesh material.  Alternatively, consider a chronic postoperative seroma and/or hematoma. 3. Small fat containing umbilical and midline supraumbilical ventral hernias.  4. Ch

## 2021-09-29 ENCOUNTER — APPOINTMENT (OUTPATIENT)
Dept: GENERAL RADIOLOGY | Facility: HOSPITAL | Age: 61
DRG: 326 | End: 2021-09-29
Attending: THORACIC SURGERY (CARDIOTHORACIC VASCULAR SURGERY)
Payer: MEDICAID

## 2021-09-29 PROCEDURE — 99233 SBSQ HOSP IP/OBS HIGH 50: CPT | Performed by: HOSPITALIST

## 2021-09-29 PROCEDURE — 71045 X-RAY EXAM CHEST 1 VIEW: CPT | Performed by: THORACIC SURGERY (CARDIOTHORACIC VASCULAR SURGERY)

## 2021-09-29 PROCEDURE — 99233 SBSQ HOSP IP/OBS HIGH 50: CPT | Performed by: INTERNAL MEDICINE

## 2021-09-29 RX ORDER — FLUCONAZOLE 2 MG/ML
200 INJECTION, SOLUTION INTRAVENOUS EVERY 24 HOURS
Status: DISCONTINUED | OUTPATIENT
Start: 2021-09-29 | End: 2021-10-07

## 2021-09-29 NOTE — PLAN OF CARE
Problem: Patient Centered Care  Goal: Patient preferences are identified and integrated in the patient's plan of care  Description: Interventions:  - Provide timely, complete, and accurate information to patient/family  - Incorporate patient and family k Evaluate effectiveness of vasoactive medications to optimize hemodynamic stability  - Monitor arterial and/or venous puncture sites for bleeding and/or hematoma  - Assess quality of pulses, skin color and temperature  - Assess for signs of decreased corona nutritional consult as needed  - Evaluate fluid balance  Outcome: Progressing  Goal: Maintains or returns to baseline bowel function  Description: INTERVENTIONS:  - Assess bowel function  - Maintain adequate hydration with IV or PO as ordered and tolerated Instruct patient on fluid and nutrition restrictions as appropriate  Outcome: Progressing     Problem: SKIN/TISSUE INTEGRITY - ADULT  Goal: Incision(s), wounds(s) or drain site(s) healing without S/S of infection  Description: INTERVENTIONS:  - Assess and

## 2021-09-29 NOTE — PROGRESS NOTES
Patient doing well with her PCA morphine dose and sleeping arouseable. Continuous end tidal carbon dioxide monitoring not applicable at this time because patient requiring high concentration of oxygen at this time since start of my shift.  I ask the RT if t

## 2021-09-29 NOTE — DIETARY NOTE
ADULT NUTRITION UPDATE NOTE    Pt is at high nutrition risk. Pt does not meet malnutrition criteria. RECOMMENDATIONS TO MD:  Trickle tube feeds initiated per orders. Will advance once orders received. ADMITTING DIAGNOSIS:   Gastric volvulus [K31. polymeric formula to start. FOOD/NUTRITION RELATED HISTORY:  Appetite: Good until recently.    Intake: NPO  Intake Meeting Needs: No but J-tube feeds start  Percent Meals Eaten (last 3 days)     Date/Time Percent Meals Eaten (%)    09/28/21 0000 0 % oz)  09/25/21 : 80.7 kg (178 lb)    NUTRITION DIAGNOSIS/PROBLEM:  Altered GI function related to altered GI function post GI surgery and partial gastrectomy as evidenced by GI surgery as above  NUTRITION DIAGNOSIS PROGRESS:  No Improvement (continue)

## 2021-09-29 NOTE — PHYSICAL THERAPY NOTE
PHYSICAL THERAPY EVALUATION - INPATIENT     Room Number: 235/794-U  Evaluation Date: 9/29/2021  Type of Evaluation: Initial   Physician Order: PT Eval and Treat    Presenting Problem: exploratory lapartomy thoracotomy  Reason for Therapy: Mobility Dysfunc Short Form. Research supports that patients with this level of impairment may benefit from Home with home health PT. RN aware of patient status post session.     Patient will benefit from continued IP PT services to address these deficits in preparation fo Good  Static Standing: Fair +  Dynamic Standin Yadiel Brooks,Fl 2 '6-Clicks' INPATIENT SHORT FORM - BASIC MOBILITY  How much difficulty does the patient currently have. ..  -   Turning over in bed (including adjusting bedclothes, sheets and blankets)?: RANDI Mares

## 2021-09-29 NOTE — PROGRESS NOTES
[facility]    Progress Note    Kashmir Alfred Patient Status:  Inpatient    1960 MRN [id number]   Location [facility] 2W/SW Attending Elaine Salter MD   [facility] Day # 2 PCP PHYSICIAN NONSTAFF     Assessment and Plan: 2200 118/65 — — 95 19 91 %   09/28/21 2030 — — — 96 17 92 %   09/28/21 2000 100/71 98.7 °F (37.1 °C) Temporal 109 21 93 %   09/28/21 1950 — — — 95 14 93 %   09/28/21 1900 102/71 — — 100 20 93 %   09/28/21 1800 96/70 — — 104 19 92 %   09/28/21 1700 104/78 — 30 --    Output (mL) (NG/OG Tube Nasogastric Left nostril) 0 1150 --    Drains  225  70  --    Output (mL) (Closed/Suction Drain 1 Abdomen Bulb ) 225 70 --    Chest Tube  362  310  --    Output (mL) (Chest Tube 1 Left Pleural 24 Fr.) 362 310 --    Total Ou displaced mesh material.  Alternatively, consider a chronic postoperative seroma and/or hematoma. 3. Small fat containing umbilical and midline supraumbilical ventral hernias. 4. Cholelithiasis. 5. There is a retroaortic left renal vein.  6. Partially image

## 2021-09-29 NOTE — PLAN OF CARE
Problem: Patient Centered Care  Goal: Patient preferences are identified and integrated in the patient's plan of care  Description: Interventions:  - What would you like us to know as we care for you?  From [place] with her daughter studying at [employer]  - signs, rhythm, and trends  - Monitor for bleeding, hypotension and signs of decreased cardiac output  - Evaluate effectiveness of vasoactive medications to optimize hemodynamic stability  - Monitor arterial and/or venous puncture sites for bleeding and/or nonpharmacologic comfort measures as appropriate  - Advance diet as tolerated, if ordered  - Obtain nutritional consult as needed  - Evaluate fluid balance  Outcome: Progressing  Goal: Maintains or returns to baseline bowel function  Description: INTERVENT urine output, blood pressure (other measures as available)  - Encourage oral intake as appropriate  - Instruct patient on fluid and nutrition restrictions as appropriate  Outcome: Progressing     Problem: SKIN/TISSUE INTEGRITY - ADULT  Goal: Incision(s), w

## 2021-09-29 NOTE — PROGRESS NOTES
General surgery addendum    We will add Diflucan 200 mg IV daily for yeast found on peritoneal culture

## 2021-09-29 NOTE — PROGRESS NOTES
Thoracic Surgery Progress Note     Julien Conner is a 57year old female. MRN [id number]. Admitted 9/27/2021    [facility] EVENTS:     No acute events overnight.    O2 sats dropped to 88%, currently on 8L HFNC, Other VSS  WBC up to 10.3 from 5.7 09/29/2021    TP 5.5 09/29/2021     09/29/2021     09/29/2021    MG 1.9 09/29/2021         Assessment/Plan:     57year old female with gastric volvulus and perforated gastric fundus s/p EGD, left thoracoabdominal incision, resection of left

## 2021-09-29 NOTE — PROGRESS NOTES
[facility]    Progress Note    Shavonne Holcomb Patient Status:  Inpatient    1960 MRN [id number]   Location [facility] 2W/SW Attending Savita Walsh MD   Hosp Day # 2 PCP PHYSICIAN NONSTAFF       Subjective:   Cinthia Sports (CPT=71045)    Result Date: 9/29/2021  CONCLUSION: Post thoracotomy. Stable position of drain in the base of the left hemithorax. Stable small pleural effusions. Stable small pulmonary opacities likely representing atelectasis.    Dictated by (CST): Mya re: treatment plan, work up and coordination of care.

## 2021-09-29 NOTE — PROGRESS NOTES
[facility]    Progress Note    Starr London Patient Status:  Inpatient    1960 MRN [id number]   Location [facility] 2W/SW Attending Maynor Sanchez MD   [facility] Day # 2 PCP PHYSICIAN NONSTAFF     Subjective:     Constit  (H) 09/29/2021     (H) 09/29/2021     09/27/2021    MG 1.9 09/29/2021       CT CHEST+ABDOMEN (ALL CNTRST ONLY) (CPT=71260/43258)    Result Date: 9/27/2021  CONCLUSION:  1.  Large mixed paraesophageal and sliding type hiatal hernia is a left hemithorax. Stable small pleural effusions. Stable small pulmonary opacities likely representing atelectasis.    Dictated by (CST): Maira Shaw MD on 9/29/2021 at 9:02 AM     Finalized by (CST): Maira Shaw MD on 9/29/2021 at 9:06 AM          XR JACKLYN

## 2021-09-29 NOTE — PLAN OF CARE
Patient A&O x4. HFNC 8L, saturating well. PCA Morphine continued per order. IVF infusing. PRN Zofran administered for nausea. All other scheduled meds administered per [facility]. Abdominal dressings changed: ABD Pads/ Tape applied to incision.  J- Tube, [address] [facility]  Description: INTERVENTIONS:  - Assess pt frequently for physical needs  - Identify cognitive and physical deficits and behaviors that affect risk of falls.   - [place] fall precautions as indicated by assessment.  - Educate pt/family on patient sa Provide Smoking Cessation handout, if applicable  - Encourage broncho-pulmonary hygiene including cough, deep breathe, Incentive Spirometry  - Assess the need for suctioning and perform as needed  - Assess and instruct to report SOB or any respiratory diff infection  Description: INTERVENTIONS:  - Assess and document risk factors for pressure ulcer development  - Assess and document skin integrity  - Assess and document dressing/incision, wound bed, drain sites and surrounding tissue  - Implement wound care

## 2021-09-30 PROCEDURE — 99232 SBSQ HOSP IP/OBS MODERATE 35: CPT | Performed by: INTERNAL MEDICINE

## 2021-09-30 PROCEDURE — 99233 SBSQ HOSP IP/OBS HIGH 50: CPT | Performed by: HOSPITALIST

## 2021-09-30 NOTE — PROGRESS NOTES
[facility]    Progress Note    Marleni Pelletier Patient Status:  Inpatient    1960 MRN [id number]   Location [facility] 2W/SW Attending Homar Dinero MD   Hosp Day # 3 PCP PHYSICIAN NONSTAFF       Subjective:   Marguerite Anders position of drain in the base of the left hemithorax. Stable small pleural effusions. Stable small pulmonary opacities likely representing atelectasis.    Dictated by (CST): Bean Dawson MD on 9/29/2021 at 9:02 AM     Finalized by (CST): Bean Dawson MD on

## 2021-09-30 NOTE — PLAN OF CARE
Patient A&O x4. HFNC 8-10L, down to 77% without oxygen. PCA Morphine continued per order. IVF infusing. All other scheduled meds administered per [facility]. Abdominal dressings changed: ABD Pads/ Tape applied to incision.  J- Tube, JADEN Drain and G-Tube w/ split g Identify cognitive and physical deficits and behaviors that affect risk of falls.   - [place] fall precautions as indicated by assessment.  - Educate pt/family on patient safety including physical limitations  - Instruct pt to call for assistance with act hygiene including cough, deep breathe, Incentive Spirometry  - Assess the need for suctioning and perform as needed  - Assess and instruct to report SOB or any respiratory difficulty  - Respiratory Therapy support as indicated  - Manage/alleviate anxiety replacement as ordered  - Monitor response to electrolyte replacements, including rhythm and repeat lab results as appropriate  - Fluid restriction as ordered  - Instruct patient on fluid and nutrition restrictions as appropriate  Outcome: Osmar Manuel mobilization of patient  - Hold pressure on venipuncture sites to achieve adequate hemostasis  - Assess for signs and symptoms of internal bleeding  - Monitor lab trends  - Patient is to report abnormal signs of bleeding to staff  - Avoid use of toothpicks

## 2021-09-30 NOTE — PROGRESS NOTES
[facility]     Progress Note        Akua Abebe Patient Status:  Inpatient    1960 MRN [id number]   Location [facility] 2W/SW Attending Naty Luevano MD   [address] Day # 3 PCP PHYSICIAN NONSTAFF       Subjective:   Rupa Donnelly Daily  morphINE sulfate (PF) 4 MG/ML injection 2 mg, 2 mg, Intravenous, Q2H PRN   Or  morphINE sulfate (PF) 4 MG/ML injection 4 mg, 4 mg, Intravenous, Q2H PRN   Or  morphINE sulfate (PF) 4 MG/ML injection 6 mg, 6 mg, Intravenous, Q2H PRN        Continuous drainage of intra-abdominal abscess. Underwent resection of thoracic paraesophageal masslike area and J-tube placement  -Continue IV antibiotic therapy at this time  -IV hydration  -Pain management  -NG tube to be discontinued today.   Increase tube feedin

## 2021-09-30 NOTE — PLAN OF CARE
Patient alert and oriented this shift, able to make needs known. No complaints of pain, no pca boluses used this shift, continues at continuous rate.  Dressing remain clean dry and intact with exception of gauze at kieran drain sight has slight serosanguino effectiveness of ordered antiemetic medications  - Provide nonpharmacologic comfort measures as appropriate  - Advance diet as tolerated, if ordered  - Obtain nutritional consult as needed  - Evaluate fluid balance  Outcome: Progressing     Problem: SKIN/T

## 2021-09-30 NOTE — PHYSICAL THERAPY NOTE
PHYSICAL THERAPY TREATMENT NOTE - INPATIENT     Room Number: 530/732-P       Presenting Problem: exploratory lapartomy thoracotomy    Problem List  Principal Problem:    Gastric volvulus  Active Problems:    Azotemia    Perforation esophagus      PHYSICAL Home     PLAN  PT Treatment Plan: Bed mobility; Body mechanics;  Patient education; Gait training; Stair training; Transfer training; Balance training; Strengthening    SUBJECTIVE  \"I can do it myself\"    OBJECTIVE  Precautions:  (abdominal surgery )    W level: independent with none      Goal #2  Current Status CGA   Goal #3 Patient is able to ambulate 100 feet with assist device: none at assistance level: independent   Goal #3   Current Status 2ft with CGA   Goal #4 Patient will negotiate 2 stairs/one cur

## 2021-09-30 NOTE — PROGRESS NOTES
Thoracic Surgery Progress Note     Gerda Kyle is a 57year old female. MRN [id number]. Admitted 9/27/2021    [address] [facility] EVENTS:     No acute events overnight.    O2 sats dropped to 88%, currently on 8L HFNC, Other VSS  WBC up to 10.3 from 5.7 09/30/2021    BILT 0.8 09/30/2021    TP 5.1 09/30/2021    AST 80 09/30/2021    ALT 74 09/30/2021    MG 1.9 09/30/2021    PHOS 1.3 09/30/2021         Assessment/Plan:     57year old female with gastric volvulus and perforated gastric fundus s/p EGD, left t

## 2021-09-30 NOTE — DIETARY NOTE
ADULT NUTRITION UPDATE NOTE    Pt is at high nutrition risk. Pt does not meet malnutrition criteria. RECOMMENDATIONS TO MD:  Tube Feeding goal:  Promote: 80 ml/hr.      ADMITTING DIAGNOSIS:   Gastric volvulus [K31.89]  Perforation esophagus [K22.3] increased to 40 ml/hr. FOOD/NUTRITION RELATED HISTORY:  Appetite: Good until recently.    Intake: NPO  Intake Meeting Needs: No but J-tube feeds advancing  Percent Meals Eaten (last 3 days)     Date/Time Percent Meals Eaten (%)    09/28/21 0000 0 %    09/ Wt Readings from Last 10 Encounters:  09/27/21 : 80.7 kg (177 lb 14.6 oz)  09/25/21 : 80.7 kg (178 lb)    NUTRITION DIAGNOSIS/PROBLEM:  Altered GI function related to altered GI function post GI surgery and partial gastrectomy as evidenced by GI surger status    Jareth Chao RD, [address] [facility], [address] [place] [address]  ([id number])

## 2021-09-30 NOTE — PROGRESS NOTES
[facility]    Progress Note    Sravan Zuluaga Patient Status:  Inpatient    1960 MRN [id number]   Location [facility] 2W/SW Attending Ricardo Mcneal MD   [facility] Day # 3 PCP PHYSICIAN NONSTAFF     Assessment and Plan: 16 Fr.) -- 362 --    IV PIGGYBACK  --  400  --    Volume (mL) (Piperacillin Sod-Tazobactam So (ZOSYN) 3.375 g in dextrose 5% 100 mL IVPB-ADDV) -- 300 --    Volume (mL) (Fluconazole in Sodium Chloride (DIFLUCAN) IVPB premix 200 mg) -- 100 --    Total Intake Estevan Phleps MD  9/30/2021

## 2021-10-01 ENCOUNTER — APPOINTMENT (OUTPATIENT)
Dept: GENERAL RADIOLOGY | Facility: HOSPITAL | Age: 61
DRG: 326 | End: 2021-10-01
Attending: THORACIC SURGERY (CARDIOTHORACIC VASCULAR SURGERY)
Payer: MEDICAID

## 2021-10-01 ENCOUNTER — APPOINTMENT (OUTPATIENT)
Dept: GENERAL RADIOLOGY | Facility: HOSPITAL | Age: 61
DRG: 326 | End: 2021-10-01
Attending: SURGERY
Payer: MEDICAID

## 2021-10-01 PROCEDURE — 99233 SBSQ HOSP IP/OBS HIGH 50: CPT | Performed by: HOSPITALIST

## 2021-10-01 PROCEDURE — 99233 SBSQ HOSP IP/OBS HIGH 50: CPT | Performed by: INTERNAL MEDICINE

## 2021-10-01 PROCEDURE — 74018 RADEX ABDOMEN 1 VIEW: CPT | Performed by: SURGERY

## 2021-10-01 PROCEDURE — 71045 X-RAY EXAM CHEST 1 VIEW: CPT | Performed by: THORACIC SURGERY (CARDIOTHORACIC VASCULAR SURGERY)

## 2021-10-01 RX ORDER — METOCLOPRAMIDE 10 MG/1
10 TABLET ORAL EVERY 6 HOURS PRN
Status: DISCONTINUED | OUTPATIENT
Start: 2021-10-01 | End: 2021-10-04

## 2021-10-01 RX ORDER — POTASSIUM CHLORIDE 14.9 MG/ML
20 INJECTION INTRAVENOUS ONCE
Status: COMPLETED | OUTPATIENT
Start: 2021-10-01 | End: 2021-10-01

## 2021-10-01 RX ORDER — METOCLOPRAMIDE HYDROCHLORIDE 5 MG/ML
10 INJECTION INTRAMUSCULAR; INTRAVENOUS EVERY 6 HOURS PRN
Status: DISCONTINUED | OUTPATIENT
Start: 2021-10-01 | End: 2021-10-04

## 2021-10-01 RX ORDER — ECHINACEA PURPUREA EXTRACT 125 MG
1 TABLET ORAL
Status: DISCONTINUED | OUTPATIENT
Start: 2021-10-01 | End: 2021-10-07

## 2021-10-01 NOTE — PROGRESS NOTES
[facility]    Progress Note    Lucille Shoshoni Patient Status:  Inpatient    1960 MRN [id number]   Location [facility] 2W/SW Attending Rolly Ortiz MD   Hosp Day # 4 PCP PHYSICIAN NONSTAFF       Subjective:   Jose Alberto Morin 09/27/2021    MG 1.9 09/30/2021    PHOS 2.1 (L) 10/01/2021       XR CHEST AP PORTABLE  (CPT=71045)    Result Date: 10/1/2021  CONCLUSION:   Stable small bilateral pleural effusions and mild bibasilar opacity which may reflect mild bibasilar atelectasis.   I

## 2021-10-01 NOTE — PHYSICAL THERAPY NOTE
PHYSICAL THERAPY TREATMENT NOTE - INPATIENT     Room Number: 826/381-C       Presenting Problem: exploratory lapartomy thoracotomy    Problem List  Principal Problem:    Gastric volvulus  Active Problems:    Azotemia    Perforation esophagus      PHYSICAL Degree of Impairment: 46.58% has been calculated based on documentation in the [facility] '6 clicks' Inpatient Basic Mobility Short Form.   Research supports that patients with this level of impairment may benefit from Home with home health PT. RN aware of sampson of session/findings; All patient questions and concerns addressed;  Family present    CURRENT GOALS   Goals to be met by: 10/15/21  Patient Goal Patient's self-stated goal is: to go home   Goal #1 Patient is able to demonstrate supine - sit EOB @ level: ind

## 2021-10-01 NOTE — PROGRESS NOTES
Thoracic Surgery Progress Note     Susan Harvey is a 57year old female. MRN [id number]. Admitted 2021    [address] [facility] [address] EVENTS:     Walked today.   No new complaints    Objective:     VITALS:     Temp (24hrs), Av.8 °F (36.6 °C), Min:97.7 ° drainage of intra-abdominal abscess, g-tube, j-tube by general surgery, POD 4. EGD during procedure negative for esophageal perforation. Maintain chest tube to suction-- OK to Ambulate off suction  Maintain O2 sats >90%, wean O2 as tolerated.  Appreciate

## 2021-10-01 NOTE — PROGRESS NOTES
[facility]    Progress Note    Sean Soni Patient Status:  Inpatient    1960 MRN [id number]   Location [facility] 2W/SW Attending Mishel Felix MD   [facility] Day # 4 PCP PHYSICIAN NONSTAFF     Assessment and Plan: NG/GT  362  8099  --    Tube Feeding Flushes (mL) -- 800 --    Intake (mL) (Gastrostomy/Enterostomy Jejunostomy 1 16 Fr.) 362 259 --    IV PIGGYBACK  400  400  --    Volume (mL) (Piperacillin Sod-Tazobactam So (ZOSYN) 3.375 g in dextrose 5% 100 mL IVPB-ADD 10/01/2021 at 8:13 AM                    Stephon Combs MD  10/1/2021

## 2021-10-01 NOTE — PLAN OF CARE
Patient has stated she has nausea. PCA pump stopped.       Problem: Patient Centered Care  Goal: Patient preferences are identified and integrated in the patient's plan of care  Description: Interventions:  - What would you like us to know as we care for yo vital signs, rhythm, and trends  - Monitor for bleeding, hypotension and signs of decreased cardiac output  - Evaluate effectiveness of vasoactive medications to optimize hemodynamic stability  - Monitor arterial and/or venous puncture sites for bleeding a Provide nonpharmacologic comfort measures as appropriate  - Advance diet as tolerated, if ordered  - Obtain nutritional consult as needed  - Evaluate fluid balance  Outcome: Progressing  Goal: Maintains or returns to baseline bowel function  Description: I labs, urine output, blood pressure (other measures as available)  - Encourage oral intake as appropriate  - Instruct patient on fluid and nutrition restrictions as appropriate  Outcome: Progressing

## 2021-10-01 NOTE — PLAN OF CARE
Patient with emesis X1 this shift, was encouraged to slow down with the   icecaps.  Refused antinausea medicine, uneventful night otherwise                                             Problem: SAFETY ADULT - FALL  Goal: Free from fall injury  Description if ordered  - Obtain nutritional consult as needed  - Evaluate fluid balance  Outcome: Progressing

## 2021-10-01 NOTE — PROGRESS NOTES
[facility] - [facility]    Progress Note    Susan Rolling Patient Status:  Inpatient    1960 MRN [id number]   Location [facility] 2W/SW Attending Sherman Choi MD   [facility] Day # 4 PCP PHYSICIAN NONSTAFF     Subjective:     Constit 09/30/2021    AST 80 (H) 09/30/2021    ALT 74 (H) 09/30/2021     09/27/2021    MG 1.9 09/30/2021    PHOS 2.1 (L) 10/01/2021       XR CHEST AP PORTABLE  (CPT=71045)    Result Date: 10/1/2021  CONCLUSION:   Stable small bilateral pleural effusions and

## 2021-10-02 PROCEDURE — 99233 SBSQ HOSP IP/OBS HIGH 50: CPT | Performed by: INTERNAL MEDICINE

## 2021-10-02 PROCEDURE — 99024 POSTOP FOLLOW-UP VISIT: CPT | Performed by: SURGERY

## 2021-10-02 PROCEDURE — 99233 SBSQ HOSP IP/OBS HIGH 50: CPT | Performed by: HOSPITALIST

## 2021-10-02 NOTE — PROGRESS NOTES
[facility]    [facility] Note    Kashmir Alfred Patient Status:  Inpatient    1960 MRN [id number]   Location [facility] 2W/SW Attending Elaine Salter MD   [address] Day # 5 PCP PHYSICIAN NONSTAFF     Assessment and Plan: % —   10/01/21 1600 (!) 131/91 98.4 °F (36.9 °C) Temporal 81 — 93 % —   10/01/21 1500 141/89 — — 95 22 92 % —   10/01/21 1400 126/86 — — 83 20 95 % —   10/01/21 1300 135/81 — — 82 20 93 % —   10/01/21 1200 118/83 97.7 °F (36.5 °C) Temporal 76 22 95 % —   1 10/02/2021     10/02/2021    CO2 32.0 10/02/2021     (H) 10/02/2021    CA 8.1 (L) 10/02/2021    ALB 1.7 (L) 09/30/2021    ALKPHO 52 09/30/2021    BILT 0.8 09/30/2021    TP 5.1 (L) 09/30/2021    AST 80 (H) 09/30/2021    ALT 74 (H) 09/30/2021

## 2021-10-02 NOTE — PLAN OF CARE
Pt up to chair for majority of day, ambulated in hallway x2.      Problem: Patient Centered Care  Goal: Patient preferences are identified and integrated in the patient's plan of care  Description: Interventions:  - Provide timely, complete, and accurate in bleeding, hypotension and signs of decreased cardiac output  - Evaluate effectiveness of vasoactive medications to optimize hemodynamic stability  - Monitor arterial and/or venous puncture sites for bleeding and/or hematoma  - Assess quality of pulses, ski appropriate  - Advance diet as tolerated, if ordered  - Obtain nutritional consult as needed  - Evaluate fluid balance  Outcome: Progressing  Goal: Maintains or returns to baseline bowel function  Description: INTERVENTIONS:  - Assess bowel function  - Lorena Resendiz measures as available)  - Encourage oral intake as appropriate  - Instruct patient on fluid and nutrition restrictions as appropriate  Outcome: Progressing     Problem: SKIN/TISSUE INTEGRITY - ADULT  Goal: Incision(s), wounds(s) or drain site(s) healing wi

## 2021-10-02 NOTE — PROGRESS NOTES
THORACIC SURGERY POST-OPERATIVE PROGRESS NOTE    Subjective: No acute events. Objective:     10/02/21  0900   BP:    Pulse: 83   Resp: 20   Temp:        I/O last 3 completed shifts:   In: 1320 [I.V.:4303; NG/GT:2080; IV PIGGYBACK:400]  Out: 1015 [Urine:3

## 2021-10-02 NOTE — PLAN OF CARE
Problem: Patient Centered Care  Goal: Patient preferences are identified and integrated in the patient's plan of care  Description: Interventions:  - What would you like us to know as we care for you?   - Provide timely, complete, and accurate informatio hypotension and signs of decreased cardiac output  - Evaluate effectiveness of vasoactive medications to optimize hemodynamic stability  - Monitor arterial and/or venous puncture sites for bleeding and/or hematoma  - Assess quality of pulses, skin color an comfort measures as appropriate  - Advance diet as tolerated, if ordered  - Obtain nutritional consult as needed  - Evaluate fluid balance  Outcome: Progressing  Note: Pt had no major complaints of nausea and no emesis throughout night.   Martha small amounts symptoms of volume excess or deficit  - Monitor intake, output and patient weight  - Monitor urine specific gravity, serum osmolarity and serum sodium as indicated or ordered  - Monitor response to interventions for patient's volume status, including labs, shaver for shaving  - Use soft bristle tooth brush  - Limit straining and forceful nose blowing  Outcome: Progressing

## 2021-10-02 NOTE — PROGRESS NOTES
[facility]    Progress Note    Susan Rolling Patient Status:  Inpatient    1960 MRN [id number]   Location [facility] 2W/SW Attending Larry Reed MD   Hosp Day # 5 PCP PHYSICIAN NONSTAFF       Subjective:   Ysabel Conner 10/1/2021  CONCLUSION:  1. Postop changes. 2. No evidence of obstruction. 3. Drains in place. 4. Skin staples. Dictated by (CST): Eric Lockwood MD on 10/01/2021 at 1:02 PM     Finalized by (CST):  Eric Lockwood MD on 10/01/2021 at 1:37 PM

## 2021-10-02 NOTE — PROGRESS NOTES
Pulmonary/Critical Care Follow Up Note    HPI:   Damian Devi is a 57year old female with Patient presents with:  Abdomen/Flank Pain      PCP PHYSICIAN NONSTAFF  Admission Attending Jonah Wilkinson MD    Hospital Day #5    Pain from chest tube  No a (1.676 m), weight 203 lb 12.8 oz (92.4 kg), SpO2 96 %.     Intake/Output Summary (Last 24 hours) at 10/2/2021 1417  Last data filed at 10/2/2021 1200  Gross per 24 hour   Intake 2494 ml   Output 5561 ml   Net -3067 ml     Nad  Sitting in chair  Lung cta  cv

## 2021-10-02 NOTE — PHYSICAL THERAPY NOTE
PHYSICAL THERAPY TREATMENT NOTE - INPATIENT     Room Number: 597/715-T       Presenting Problem: exploratory lapartomy thoracotomy    Problem List  Principal Problem:    Gastric volvulus  Active Problems:    Azotemia    Perforation esophagus      PHYSICAL MOBILITY  How much difficulty does the patient currently have. ..  -   Turning over in bed (including adjusting bedclothes, sheets and blankets)?: A Little   -   Sitting down on and standing up from a chair with arms (e.g., wheelchair, bedside commode, etc. tested    Goal #5 Patient to demonstrate independence with home activity/exercise instructions provided to patient in preparation for discharge.    Goal #5   Current Status In progress

## 2021-10-03 PROCEDURE — 99024 POSTOP FOLLOW-UP VISIT: CPT | Performed by: SURGERY

## 2021-10-03 PROCEDURE — 99232 SBSQ HOSP IP/OBS MODERATE 35: CPT | Performed by: HOSPITALIST

## 2021-10-03 PROCEDURE — 99232 SBSQ HOSP IP/OBS MODERATE 35: CPT | Performed by: INTERNAL MEDICINE

## 2021-10-03 NOTE — PLAN OF CARE
Patient AO x4. Chest tube and michaud catheter removed today. G-Tube capped. Patient ambulated in guidry x2 with minimal assistance, and has been ambulating to and from bathroom with minimal assistance throughout the day. Up in chair for majority of day.  Trans OT/PT consult to assist with strengthening/mobility  - Encourage toileting schedule  Outcome: Progressing     Problem: CARDIOVASCULAR - ADULT  Goal: Maintains optimal cardiac output and hemodynamic stability  Description: INTERVENTIONS:  - Monitor vital si absence of nausea and vomiting  Description: INTERVENTIONS:  - Maintain adequate hydration with IV or PO as ordered and tolerated  - Nasogastric tube to low intermittent suction as ordered  - Evaluate effectiveness of ordered antiemetic medications  - Prov symptoms of volume excess or deficit  - Monitor intake, output and patient weight  - Monitor urine specific gravity, serum osmolarity and serum sodium as indicated or ordered  - Monitor response to interventions for patient's volume status, including labs, blowing  Outcome: Progressing

## 2021-10-03 NOTE — PLAN OF CARE
Patient is alert and oriented x 4, remains on 2L nasal cannula. Fluids infusing. Bowel movement x 1, formed, brown. Excellent urine output. JADEN drain x 1. Chest tube with 90ccs out. G-tube to gravity. J-tube to tube feeding. Denies pain.  Zofran given for na

## 2021-10-03 NOTE — PROGRESS NOTES
[facility]    Progress Note    Luke Ibrahim Patient Status:  Inpatient    1960 MRN [id number]   Location [facility] 2W/SW Attending Pietro Shi MD   Hosp Day # 6 PCP PHYSICIAN NONSTAFF     Subjective:   Chest t

## 2021-10-03 NOTE — PHYSICAL THERAPY NOTE
PHYSICAL THERAPY TREATMENT NOTE - INPATIENT     Room Number: 541/410-L       Presenting Problem: exploratory lapartomy thoracotomy    Problem List  Principal Problem:    Gastric volvulus  Active Problems:    Azotemia    Perforation esophagus      PHYSICAL Activity promotion; Body mechanics; Breathing techniques; Relaxation; Repositioning    BALANCE                                                                                                                       Static Sitting: Good  Dynamic Sitting:  Waqas Ruiz level: independent   Goal #3   Current Status 40ft with rolling walker CGA   Goal #4 Patient will negotiate 2 stairs/one curb w/ assistive device and supervision   Goal #4   Current Status Not tested    Goal #5 Patient to demonstrate independence with home

## 2021-10-03 NOTE — PROGRESS NOTES
Pulmonary/Critical Care Follow Up Note    HPI:   Marilyn Ni is a 57year old female with Patient presents with:  Abdomen/Flank Pain      PCP PHYSICIAN NONSTAFF  Admission Attending Oliva Tidwell MD    Hospital Day #6    Chest tube out  Feeling be height 5' 6\" (1.676 m), weight 203 lb 12.8 oz (92.4 kg), SpO2 95 %.     Intake/Output Summary (Last 24 hours) at 10/3/2021 1514  Last data filed at 10/3/2021 1400  Gross per 24 hour   Intake 3376 ml   Output 5895 ml   Net -2519 ml     NAD  Sitting in chair

## 2021-10-03 NOTE — PROGRESS NOTES
[facility] [place]    [facility] Note    Keisha Isaacs Patient Status:  Inpatient    1960 MRN [id number]   Location [facility] 2W/SW Attending Weston Davalos MD   Hosp Day # 6 PCP PHYSICIAN NONSTAFF     Assessment and Plan: Temporal 89 24 97 %   10/02/21 1100 — — — 88 19 97 %   10/02/21 1000 127/85 — — 81 26 93 %   10/02/21 0900 — — — 83 20 93 %       Intake/Output                 10/01/21 0700 - 10/02/21 0659 10/02/21 0700 - 10/03/21 0659 10/03/21 0700 - 10/04/21 2614 Postop changes. 2. No evidence of obstruction. 3. Drains in place. 4. Skin staples. Dictated by (CST): Alanis Hernandez MD on 10/01/2021 at 1:02 PM     Finalized by (CST):  Alanis Hernandez MD on 10/01/2021 at 1:37 PM                  Nandini Mei

## 2021-10-04 ENCOUNTER — APPOINTMENT (OUTPATIENT)
Dept: PICC SERVICES | Facility: HOSPITAL | Age: 61
DRG: 326 | End: 2021-10-04
Attending: HOSPITALIST
Payer: MEDICAID

## 2021-10-04 PROCEDURE — 99233 SBSQ HOSP IP/OBS HIGH 50: CPT | Performed by: HOSPITALIST

## 2021-10-04 PROCEDURE — 99232 SBSQ HOSP IP/OBS MODERATE 35: CPT | Performed by: INTERNAL MEDICINE

## 2021-10-04 RX ORDER — PROCHLORPERAZINE EDISYLATE 5 MG/ML
10 INJECTION INTRAMUSCULAR; INTRAVENOUS EVERY 6 HOURS PRN
Status: DISCONTINUED | OUTPATIENT
Start: 2021-10-04 | End: 2021-10-07

## 2021-10-04 NOTE — PLAN OF CARE
Zoraida Hsieh is aware of the POC at this time. She is reporting mild nausea today. No emesis. Zofran and compazine given as needed. She is tolerating sips of clears. J-tube feedings continued. Plan for patient to transition to cyclic feedings.        No complai pt/family on patient safety including physical limitations  - Instruct pt to call for assistance with activity based on assessment  - Modify environment to reduce risk of injury  - Provide assistive devices as appropriate  - Consider OT/PT consult to rahel or any respiratory difficulty  - Respiratory Therapy support as indicated  - Manage/alleviate anxiety  - Monitor for signs/symptoms of CO2 retention  Outcome: Progressing     Problem: GASTROINTESTINAL - ADULT  Goal: Minimal or absence of nausea and vomitin Instruct patient on fluid and nutrition restrictions as appropriate  Outcome: Progressing  Goal: Hemodynamic stability and optimal renal function maintained  Description: INTERVENTIONS:  - Monitor labs and assess for signs and symptoms of volume excess or trends  - Patient is to report abnormal signs of bleeding to staff  - Avoid use of toothpicks and dental floss  - Use electric shaver for shaving  - Use soft bristle tooth brush  - Limit straining and forceful nose blowing  Outcome: Progressing     Problem discharge planning if the patient needs post-hospital services based on physician/LIP order or complex needs related to functional status, cognitive ability or social support system  Outcome: Progressing

## 2021-10-04 NOTE — PROGRESS NOTES
[place] [facility] - [facility]    Progress Note    Damian Devi Patient Status:  Inpatient    1960 MRN [id number]   Location [facility] 4W/SW/SE Attending Matt Patiño MD   [facility] Day # 7 PCP PHYSICIAN NONSTAFF     Assessment and Plan: Catheter Latex) 5029 3000 --    Emesis/NG output  0  --  --    Output (mL) (Gastrostomy/Enterostomy Gastrostomy 1 18 Fr.) 0 -- --    Drains  115  90  --    Output (mL) (Closed/Suction Drain 1 Abdomen Bulb ) 115 90 --    Stool  --  --  --    Stool Count Art

## 2021-10-04 NOTE — PROGRESS NOTES
[facility]    Progress Note    Sandra Kellogg Patient Status:  Inpatient    1960 MRN [id number]   Location [facility] 2W/SW Attending Mallorie Barragan MD   Hosp Day # 6 PCP PHYSICIAN NONSTAFF       Subjective:   Pola Weir results found.           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

## 2021-10-04 NOTE — PROGRESS NOTES
[facility]    Progress Note    Hitesh Leonard Patient Status:  Inpatient    1960 MRN [id number]   Location [facility] 2W/SW Attending Kiki Bedoya MD   Hosp Day # 7 PCP PHYSICIAN NONSTAFF       Subjective:   Catie Monte hernia with volvulus and distal esophageal perforation  -s/p ex lap, extensive virginia, partial gastrectomy, omental flap, insertion of he gastrostomy tube, insertion of feeding jejunostomy tube, repair of ventral hernia, lavage and drainage of abdominal ab

## 2021-10-04 NOTE — PLAN OF CARE
Patient transferred to floor from CCU. AxOx4. Tele in place. Voiding. BM overnight. Nausea managed with PRN zofran/compazine. C/o soreness in abdominal area/L sided chest s/p chest tube removal. Ambulating with SBA assistance. IV abx continued.  TF to J tub oxygenation and minimize respiratory effort  - Oxygen supplementation based on oxygen saturation or ABGs  - Provide Smoking Cessation handout, if applicable  - Encourage broncho-pulmonary hygiene including cough, deep breathe, Incentive Spirometry  - Asses mobilization of patient  - Hold pressure on venipuncture sites to achieve adequate hemostasis  - Assess for signs and symptoms of internal bleeding  - Monitor lab trends  - Patient is to report abnormal signs of bleeding to staff  - Avoid use of toothpicks

## 2021-10-04 NOTE — DIETARY NOTE
ADULT NUTRITION REASSESSMENT    Pt is at high nutrition risk. Pt does not meet malnutrition criteria. RECOMMENDATIONS TO MD:  See below for TF specifics. RD to adjust formula once tolerance confirmed with cyclic feeds.       ADMITTING DIAGNOSIS:   Gas protein standard polymeric formula to start. 9/30: TF increased to 40 ml/hr. Update 10/04/21:  TF increased to 60 ml/hr today and plan to change to cyclic feeds tomorrow. To infuse from 7PM-7AM. TF only providing 880 kcal ~50% minimum needs.  Pt on CLD (5' 6\")  WT: 92.4 kg (203 lb 12.8 oz) 9/27 last wt. BMI: Body mass index is 32.89 kg/m².   BMI CLASSIFICATION: 25-29.9 kg/m2 - overweight  IBW: 130 lbs        133 % IBW  Usual Body Wt: 178  lbs       100 % UBW    WEIGHT HISTORY:  Patient Weight(s) for th set up  - Nutrition education: assess education needs described nutrition plan of care going forward.    - Coordination of nutrition care: collaboration with other providers and discussed in Care Rounds   - Discharge and transfer of nutrition care to [facility]

## 2021-10-04 NOTE — PROGRESS NOTES
Patient transferring to Room 466. Report given to [facility]. Skin check done with Pily Brooke RN - surgical sites noted. JADEN, G & J tube in place. Belongings sent with patient.

## 2021-10-04 NOTE — PHYSICAL THERAPY NOTE
Attempted physical therapy treatment. Patient states she is tired this AM. Would like to ambulate later in the day but is conserving her energy for a shower later. Will follow up as able. RN aware of attempt.

## 2021-10-04 NOTE — PROGRESS NOTES
Pulmonary/Critical Care Follow Up Note    HPI:   Shi Monday is a 57year old female with Patient presents with:  Abdomen/Flank Pain      PCP PHYSICIAN NONSTAFF  Admission Attending Suresh Echevarria MD    Hospital Day #7    Feeling much better  [facility] edema      LABS:    Lab Results   Component Value Date    WBC 7.4 10/04/2021    HGB 9.9 10/04/2021    HCT 30.8 10/04/2021    .0 10/04/2021    CREATSERUM 0.84 10/04/2021    BUN 12 10/04/2021     10/04/2021    K 3.8 10/04/2021     10/04/20

## 2021-10-04 NOTE — PROGRESS NOTES
Thoracic Surgery Progress Note     Geovani Mccartney is a 57year old female. MRN [id number]. Admitted 9/27/2021    [facility] EVENTS:     PIV placed today. Complaining of nausea, denies emesis. TF now cycled. Afebrile, on RA. WBC WNL.     Ob perforation. Maintain O2 sats >90%, wean O2 as tolerated.  Appreciate pulmonary recs  Ambulate as able, work with PT/OT  Encouraged cough and IS use - coached on IS use today  Diet, Fluids per gen surg    Estee Hamilton PA-C  Thoracic Surgery  Pager: [phone]

## 2021-10-05 PROCEDURE — 99233 SBSQ HOSP IP/OBS HIGH 50: CPT | Performed by: HOSPITALIST

## 2021-10-05 RX ORDER — FLUTICASONE PROPIONATE 50 MCG
1 SPRAY, SUSPENSION (ML) NASAL DAILY
Status: DISCONTINUED | OUTPATIENT
Start: 2021-10-05 | End: 2021-10-07

## 2021-10-05 RX ORDER — CETIRIZINE HYDROCHLORIDE 10 MG/1
10 TABLET ORAL DAILY
Status: DISCONTINUED | OUTPATIENT
Start: 2021-10-05 | End: 2021-10-05

## 2021-10-05 RX ORDER — FLUTICASONE PROPIONATE 50 MCG
1 SPRAY, SUSPENSION (ML) NASAL DAILY
Status: DISCONTINUED | OUTPATIENT
Start: 2021-10-05 | End: 2021-10-05

## 2021-10-05 RX ORDER — CETIRIZINE HYDROCHLORIDE 10 MG/1
10 TABLET ORAL DAILY
Status: DISCONTINUED | OUTPATIENT
Start: 2021-10-05 | End: 2021-10-07

## 2021-10-05 NOTE — PROGRESS NOTES
[facility]    Progress Note    Dulcy Sizer Patient Status:  Inpatient    1960 MRN [id number]   Location [facility] 4W/SW/SE Attending Kell Santiago MD   [facility] Day # 8 PCP PHYSICIAN NONSTAFF     Assessment and Plan: Urethral Catheter Latex) 3000 -- --    Emesis/NG output  --  0  --    Residual volume (ml) (Gastrostomy/Enterostomy Gastrostomy 1 18 Fr.) -- 0 --    Output (mL) (Gastrostomy/Enterostomy Gastrostomy 1 18 Fr.) -- 0 --    Drains  90  25  --    Output (mL) (Cl

## 2021-10-05 NOTE — PLAN OF CARE
Sunny Sanchez is aware of the POC at this time. She is reporting some nausea. No emesis. She is tolerating small bites of full liquids. J-tube feedings continued. Plan for cyclic feedings to be started tonight. No complaints of pain throughout the shift. to reduce risk of injury  - Provide assistive devices as appropriate  - Consider OT/PT consult to assist with strengthening/mobility  - Encourage toileting schedule  Outcome: Progressing     Problem: RESPIRATORY - ADULT  Goal: Achieves optimal ventilation within prescribed range  Description: INTERVENTIONS:  - Monitor Blood Glucose as ordered  - Assess for signs and symptoms of hyperglycemia and hypoglycemia  - Administer ordered medications to maintain glucose within target range  - Assess barriers to adeq Problem: HEMATOLOGIC - ADULT  Goal: Maintains hematologic stability  Description: INTERVENTIONS  - Assess for signs and symptoms of bleeding or hemorrhage  - Monitor labs and vital signs for trends  - Administer supportive blood products/factors, fluids Include patient/family/discharge partner in discharge planning  - Arrange for needed discharge resources and transportation as appropriate  - Identify discharge learning needs (meds, wound care, etc)  - Arrange for interpreters to assist at discharge as ne

## 2021-10-05 NOTE — PLAN OF CARE
Nancydeocameron Hermila denies pain or nausea, having soft bowel movements. Tolerating tube feedings at 60ml/hr, plan to switch to cyclic feedings 6V-3P starting 10/5. Incisions clean with dressings intact. Abdominal binder in place.   J-tube to tube feedings, G-tube clam assistance with activity based on assessment  - Modify environment to reduce risk of injury  - Provide assistive devices as appropriate  - Consider OT/PT consult to assist with strengthening/mobility  - Encourage toileting schedule  Outcome: Progressing Manage/alleviate anxiety  - Monitor for signs/symptoms of CO2 retention  Outcome: Progressing     Problem: GASTROINTESTINAL - ADULT  Goal: Minimal or absence of nausea and vomiting  Description: INTERVENTIONS:  - Maintain adequate hydration with IV or PO a Progressing  Goal: Hemodynamic stability and optimal renal function maintained  Description: INTERVENTIONS:  - Monitor labs and assess for signs and symptoms of volume excess or deficit  - Monitor intake, output and patient weight  - Monitor urine specific of toothpicks and dental floss  - Use electric shaver for shaving  - Use soft bristle tooth brush  - Limit straining and forceful nose blowing  Outcome: Progressing     Problem: PAIN - ADULT  Goal: Verbalizes/displays adequate comfort level or patient's st order or complex needs related to functional status, cognitive ability or social support system  Outcome: Progressing

## 2021-10-05 NOTE — DIETARY NOTE
Brief Nutrition Note     Pt tolerating TF volume of 60 ml/hr per RN. Pt now on FLD this AM and Ensure Compact added to optimize intake. Minimal nausea reports. Plan to adjust TF formula tonight. See below for TF specifics.  Will monitor PO intake and adjust

## 2021-10-05 NOTE — PHYSICAL THERAPY NOTE
Attempted physical therapy treatment. Patient was observed ambulating in hallway with family earlier this AM. Patient presented in bed with no concerns or questions. Reports no therapy needs at this time. Will discharge from caseload. RN aware.

## 2021-10-05 NOTE — PROGRESS NOTES
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

## 2021-10-05 NOTE — CM/SW NOTE
SW met with patient and family at bedside, introduced self and role. Patient is from [place]. Lives with her  at (address on face sheet).  Patient was in [place] visiting her daughter who goes to [employer] and was just about to leave for the airport ba

## 2021-10-05 NOTE — PROGRESS NOTES
Thoracic Surgery Progress Note     Starr London is a 57year old female. MRN [id number]. Admitted 9/27/2021    [address] [place] [address] EVENTS:     No acute events overnight.    Developed pruritic blister-like rash over back and legs   Working on [employer] procedure negative for esophageal perforation.     Maintain O2 >90%  Ambulate as able, work with PT/OT  Spend majority of day out of bed, in chair  Encouraged cough and IS use  Diet, fluids per gen surg  Chest tube stitch removed    Kitty \"Nasir\" Lenin Knight

## 2021-10-06 PROCEDURE — 99233 SBSQ HOSP IP/OBS HIGH 50: CPT | Performed by: HOSPITALIST

## 2021-10-06 RX ORDER — ACETAMINOPHEN 325 MG/1
650 TABLET ORAL EVERY 6 HOURS PRN
Status: DISCONTINUED | OUTPATIENT
Start: 2021-10-06 | End: 2021-10-07

## 2021-10-06 NOTE — PROGRESS NOTES
Thoracic Surgery Progress Note     Susan Harvey is a 57year old female. MRN [id number]. Admitted 9/27/2021    [facility] EVENTS:     No acute events overnight.    Rash improved  Dressing over chest tube site still in place  Waiting for home tub surgery and partial gastrectomy, drainage of intra-abdominal abscess, g-tube, j-tube by general surgery, POD 7. EGD during procedure negative for esophageal perforation.     Ambulate as able, PT/OT  Spend majority of day out of bed, in chair  Encouraged cou

## 2021-10-06 NOTE — CM/SW NOTE
SW notified by surgery that patient may be cleared for DC tomorrow pending progress. SW met with patient at bedside. Patient confirmed that she will be staying in [place] for at least a week as it is unsafe for her to travel.   Patient will be staying

## 2021-10-06 NOTE — PLAN OF CARE
No acute changes overnight. Jtube to cyclic tube feedings. No c/o nausea. Voiding. No pain. VSS.      Problem: SAFETY ADULT - FALL  Goal: Free from fall injury  Description: INTERVENTIONS:  - Assess pt frequently for physical needs  - Identify cognitive and indicated  Outcome: Progressing     Problem: HEMATOLOGIC - ADULT  Goal: Free from bleeding injury  Description: (Example usage: patient with low platelets)  INTERVENTIONS:  - Avoid intramuscular injections, enemas and rectal medication administration  - En

## 2021-10-06 NOTE — PLAN OF CARE
Mauro Jurado is aware of the POC at this time. She is reporting some nausea. No emesis. She is tolerating small bites of a general diet. She is having Bms. J-tube feedings continued. Plan for cyclic feedings to be restarted tonight.      No complaints of pain assistance with activity based on assessment  - Modify environment to reduce risk of injury  - Provide assistive devices as appropriate  - Consider OT/PT consult to assist with strengthening/mobility  - Encourage toileting schedule  Outcome: Progressing METABOLIC/FLUID AND ELECTROLYTES - ADULT  Goal: Glucose maintained within prescribed range  Description: INTERVENTIONS:  - Monitor Blood Glucose as ordered  - Assess for signs and symptoms of hyperglycemia and hypoglycemia  - Administer ordered medications Pressure Ulcer prevention bundle as indicated  Outcome: Progressing     Problem: HEMATOLOGIC - ADULT  Goal: Maintains hematologic stability  Description: INTERVENTIONS  - Assess for signs and symptoms of bleeding or hemorrhage  - Monitor labs and vital sig INTERVENTIONS:  - Identify barriers to discharge w/pt and caregiver  - Include patient/family/discharge partner in discharge planning  - Arrange for needed discharge resources and transportation as appropriate  - Identify discharge learning needs (meds, wo

## 2021-10-06 NOTE — PROGRESS NOTES
[facility] - [facility]    Progress Note    Vernard Osgood Patient Status:  Inpatient    1960 MRN [id number]   Location [facility] 2W/SW Attending Amanda Rios MD   Hosp Day # 9 PCP PHYSICIAN NONSTAFF       Subjective:   Elvin Metzger results found.           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

## 2021-10-06 NOTE — PROGRESS NOTES
[facility]    Progress Note    Sravan Zuluaga Patient Status:  Inpatient    1960 MRN [id number]   Location [facility] 4W/SW/SE Attending Dafne Lui MD   Hosp Day # 9 PCP PHYSICIAN NONSTAFF     Assessment and Plan: --    Stool  --  --  --    Stool Count Calculated for I/O 5 x 3 x --    Total Output 2538 075 --       Net I/O     [phone]          Exam: Abdomen is soft sites are clean dry intact    Results:     Lab Results   Component Value Date    WBC 7.7 10/06

## 2021-10-07 VITALS
TEMPERATURE: 99 F | DIASTOLIC BLOOD PRESSURE: 74 MMHG | OXYGEN SATURATION: 93 % | HEART RATE: 94 BPM | BODY MASS INDEX: 29.63 KG/M2 | HEIGHT: 66 IN | SYSTOLIC BLOOD PRESSURE: 146 MMHG | RESPIRATION RATE: 18 BRPM | WEIGHT: 184.38 LBS

## 2021-10-07 PROCEDURE — 99239 HOSP IP/OBS DSCHRG MGMT >30: CPT | Performed by: HOSPITALIST

## 2021-10-07 RX ORDER — FLUTICASONE PROPIONATE 50 MCG
1 SPRAY, SUSPENSION (ML) NASAL DAILY
Qty: 9.9 ML | Refills: 0 | Status: SHIPPED | OUTPATIENT
Start: 2021-10-08

## 2021-10-07 RX ORDER — CETIRIZINE HYDROCHLORIDE 10 MG/1
10 TABLET ORAL DAILY
Qty: 30 TABLET | Refills: 0 | Status: SHIPPED | OUTPATIENT
Start: 2021-10-08

## 2021-10-07 NOTE — PROGRESS NOTES
Thoracic Surgery Progress Note     Keisha Isaacs is a 57year old female. MRN [id number]. Admitted 9/27/2021    [address] [facility]:     Planning for discharge today. She is returning to [place] next Wednesday.  She has an appointment with gen majority of day out of bed, in chair  Encouraged cough and IS use  Diet per gen surg    Deepali Perez PA-C  Thoracic Surgery  Pager: [phone]

## 2021-10-07 NOTE — PLAN OF CARE
Patient is tolerating general diet well and taking supplements for additional nutritional benefit. Plan to stop tube feedings and follow up with Dr. Hammad Oh for possible GT and JT removal on Monday.  Patient to fly back to [place] to follow up with primary deficits and behaviors that affect risk of falls.   - [place] fall precautions as indicated by assessment.  - Educate pt/family on patient safety including physical limitations  - Instruct pt to call for assistance with activity based on assessment  - Mod medications  - Encourage mobilization and activity  - Obtain nutritional consult as needed  - Establish a toileting routine/schedule  - Consider collaborating with pharmacy to review patient's medication profile  Outcome: Adequate for Discharge     Problem Assess and document risk factors for pressure ulcer development  - Assess and document skin integrity  - Assess and document dressing/incision, wound bed, drain sites and surrounding tissue  - Implement wound care per orders  - Initiate isolation precautio Monitor for opioid side effects  - Notify MD/LIP if interventions unsuccessful or patient reports new pain  - Anticipate increased pain with activity and pre-medicate as appropriate  Outcome: Adequate for Discharge     Problem: DISCHARGE PLANNING  Goal: Paty Ngo

## 2021-10-07 NOTE — PLAN OF CARE
No acute events overnight. Tylenol administered for headache with relief. Cyclic Tube feeds going froom 7p-7a. Surgical Insicions intact. Bmx1. Voiding freely. Ambulating with SBA. Will continue plan of care.     Problem: Patient Centered Care  Goal: Marina strengthening/mobility  - Encourage toileting schedule  Outcome: Progressing     Problem: RESPIRATORY - ADULT  Goal: Achieves optimal ventilation and oxygenation  Description: INTERVENTIONS:  - Assess for changes in respiratory status  - Assess for changes and symptoms of hyperglycemia and hypoglycemia  - Administer ordered medications to maintain glucose within target range  - Assess barriers to adequate nutritional intake and initiate nutrition consult as needed  - Instruct patient on self management of di signs and symptoms of bleeding or hemorrhage  - Monitor labs and vital signs for trends  - Administer supportive blood products/factors, fluids and medications as ordered and appropriate  - Administer supportive blood products/factors as ordered and approp transportation as appropriate  - Identify discharge learning needs (meds, wound care, etc)  - Arrange for interpreters to assist at discharge as needed  - Consider post-discharge preferences of patient/family/discharge partner  - Complete POLST form as [place]

## 2021-10-07 NOTE — PROGRESS NOTES
General surgery note    Patient is doing well and continues to improve. Tolerating her general diet.   I believe she can be discharged on a regular diet with supplemental Ensure or boost.  We can hold on jejunal tube feeds as this may proved to be difficul

## 2021-10-07 NOTE — CM/SW NOTE
ANDREA called and spoke with [facility] [place] at [facility]. [facility] [place] states that [employer] will be able to deliver supplies to [place] by tomorrow 10/8 and  [facility] staff will be able to provide teach and train with supplies at bedside.      Will need fin

## 2021-10-07 NOTE — DISCHARGE SUMMARY
[place] [facility] - [facility]    Discharge Summary    Marisol Hand Patient Status:  Inpatient    1960 MRN [id number]   Location [facility] 4W/SW/SE Attending Stanislav Colmenares MD   Hosp Day # 10 PCP PHYSICIAN NONSTAFF     Date of Admissi shift. Lactic acid 2.5. Chemistry was unremarkable. 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   There is unfavorable interval change -s/p ex lap, extensive virginia, partial gastrectomy, omental flap, insertion of he gastrostomy tube, insertion of feeding jejunostomy tube, repair of ventral hernia, lavage and drainage of abdominal abscess on 9/27  -s/p egd, left thoracoabdominal incisio

## 2021-10-11 ENCOUNTER — OFFICE VISIT (OUTPATIENT)
Dept: SURGERY | Facility: CLINIC | Age: 61
End: 2021-10-11

## 2021-10-11 DIAGNOSIS — Z98.890 POST-OPERATIVE STATE: Primary | ICD-10-CM

## 2021-10-11 PROCEDURE — 99024 POSTOP FOLLOW-UP VISIT: CPT | Performed by: SURGERY

## 2021-10-11 NOTE — PROGRESS NOTES
Postoperative Patient Follow-up      10/11/2021    HONG      Marily Inman is a 57year old female postop visit after gastric volvulus with perforation. She is set to return back to [place] in 4 days.   She was discharged home last week on a regular

## 2021-10-11 NOTE — PATIENT INSTRUCTIONS
Follow-up with a surgeon in [place] within a week. Change gauze over the G-tube and J-tube site as needed per saturation. Okay to apply  Vit E  moisturizer to your incision.

## 2021-10-14 ENCOUNTER — APPOINTMENT (OUTPATIENT)
Dept: HEMATOLOGY/ONCOLOGY | Facility: HOSPITAL | Age: 61
End: 2021-10-14
Attending: THORACIC SURGERY (CARDIOTHORACIC VASCULAR SURGERY)
Payer: MEDICAID

## 2021-12-30 NOTE — PAYOR COMM NOTE
--------------  DISCHARGE REVIEW    Payor: [employer]hel[employer] #:  [id number]  Authorization Number: [id number]    Admit date: 9/27/21  Admit time:   2:03 PM  Discharge Date: 10/7/2021  2:45 PM     Admitting Physician: Mariluz Rao MD  [facility]    Discharge Summary    Pincus Olszewski Patient Status:  Inpatient    1960 MRN [id number]   Location [facility] 4W/SW/SE Attending Apolonia Sheets MD   Hosp Day # 10 PCP PHYSICIAN NONSTAFF     Date of Admission: 2021 Dispos 2.5.  Chemistry was unremarkable. 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   There is unfavorable interval change from CT 2 days ago w extensive virginia, partial gastrectomy, omental flap, insertion of he gastrostomy tube, insertion of feeding jejunostomy tube, repair of ventral hernia, lavage and drainage of abdominal abscess on 9/27  -s/p egd, left thoracoabdominal incision and resection Jefferson Lowe #:  [id number]  Authorization Number: [id number]    Admit date: 9/27/21  Admit time:  2:03 PM       REVIEW DOCUMENTATION:     ED Provider Notes      ED Provider Notes signed by Monique Zuñiga MD at 9/28/2021 12:27 AM     Author: Rosio Lacey 09/27/21 0925 97 %   O2 Device 09/27/21 0925 None (Room air)       Current:BP 91/67   Pulse 84   Temp 97.6 °F (36.4 °C) (Temporal)   Resp 16   Ht 167.6 cm (5' 6\")   Wt 80.7 kg   SpO2 96%   BMI 28.72 kg/m²         Physical Exam  Vitals and nursing note rev PLASMA - Abnormal; Notable for the following components:    Lactic Acid 2.2 (*)     All other components within normal limits   POCT ISTAT CG8 CARTRIDGE - Abnormal; Notable for the following components:    ISTAT Sodium 146 (*)     ISTAT Potassium 3.4 (*) Final result                 Please view results for these tests on the individual orders.    ABORH (BLOOD TYPE)   ANTIBODY SCREEN   PREPARE RBC   SURGICAL PATHOLOGY TISSUE   RAINBOW DRAW LAVENDER   RAINBOW DRAW LIGHT GREEN   RAINBOW Dr. Ermelinda Teixeira, by Dr. Fernando Arredondo at [phone] on 09/27/2021.    elm-remote  Dictated by (CST): Valencia Hollis MD on 9/27/2021 at 11:11 AM     Finalized by (CST): Valencia Hollis MD on 9/27/2021 at 11:30 AM          XR CHEST AP PORTABLE  (CPT=71045)    Result Date: 9/27/2 administration in time range)   sodium chloride 0.9% IV bolus 1,000 mL (has no administration in time range)   morphINE sulfate (PF) 4 MG/ML injection 4 mg (4 mg Intravenous Given 9/27/21 0987)   sodium chloride 0.9% IV bolus 1,000 mL (0 mL Intravenous Sto Service: Hospitalist Author Type: Physician    Filed: 9/27/2021  1:28 PM Status: Signed    : Irvin Larry MD (Physician)         Josefina Boyd 43 NAME: Art GARCIA   ATTENDING PHYSICIAN: Lea Isidro MD   PATIENT ACCOUNT#: 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 This is a known finding per the patient. The patient was started on IV fluids, Protonix, IV Zosyn. She will be admitted to the hospital for further management.     PAST MEDICAL HISTORY sensory intact. Cranial nerves II through XII are intact. 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   The

## 2024-03-09 NOTE — VASCULAR ACCESS
If your pain should worsen before you get an appointment with the surgeon, go to [facility] for a surgeon to evaluate you in person.  Be sure to follow-up with your doctor regarding the elevated blood pressure and for referral to a dietitian.   Vascular Access Consult Note  10/4/2021     Reviewed H&P and current condition.   Past Medical History:  No date: Hiatal hernia       Reviewed current medication list:    Intravenous Infusion:   • dextrose     • morphine 1mg/mL in NS 30 mL Stopped (10/01/21 (PROTONIX) 40 mg in Sodium Chloride (PF) 0.9 % 10 mL IV push, 40 mg, Intravenous, Daily  [COMPLETED] HYDROmorphone HCl (DILAUDID) 1 MG/ML injection 1 mg, 1 mg, Intravenous, Once  morphINE sulfate (PF) 4 MG/ML injection 2 mg, 2 mg, Intravenous, Q2H PRN   Or

## (undated) DEVICE — Device

## (undated) DEVICE — SUTURE SILK 2-0 SH

## (undated) DEVICE — CULTURE COLLECT/TRANSPORT SYS

## (undated) DEVICE — SUTURE ETHILON 3-0 669H

## (undated) DEVICE — SUTURE PDS II 0 CTX

## (undated) DEVICE — PSI-TEC TUBING: Brand: PSI-TEC TUBING

## (undated) DEVICE — SUTURE VICRYL 2-0 J849G

## (undated) DEVICE — ENSEAL 20 CM SHAFT, LARGE JAW: Brand: ENSEAL X1

## (undated) DEVICE — SOL H2O 1000ML BTL

## (undated) DEVICE — SPONGE LAP 18X18IN 7IN LOOP

## (undated) DEVICE — THORACIC: Brand: MEDLINE INDUSTRIES, INC.

## (undated) DEVICE — PROXIMATE RELOADABLE LINEAR CUTTER WITH SAFETY LOCK-OUT, 75MM: Brand: PROXIMATE

## (undated) DEVICE — GAUZE SPONGES,12 PLY: Brand: CURITY

## (undated) DEVICE — DRAIN RESERVOIR RELIAVAC 100CC

## (undated) DEVICE — PROXIMATE LINEAR CUTTER RELOAD, BLUE, 75MM: Brand: PROXIMATE

## (undated) DEVICE — BAG DRAIN INFECTION CNTRL 2000

## (undated) DEVICE — INSULATED BLADE ELECTRODE 6.5

## (undated) DEVICE — SUTURE PROLENE 1 CTX

## (undated) DEVICE — A P RESECTION: Brand: MEDLINE INDUSTRIES, INC.

## (undated) DEVICE — SINGLE USE SUCTION VALVE MAJ-209: Brand: SINGLE USE SUCTION VALVE (STERILE)

## (undated) DEVICE — 3M™ STERI-DRAPE™ INSTRUMENT POUCH 1018: Brand: STERI-DRAPE™

## (undated) DEVICE — TOWEL SURG OR 17X30IN BLUE

## (undated) DEVICE — PACK SRG UNV 1 STRL LF

## (undated) DEVICE — GAMMEX® PI HYBRID SIZE 7, STERILE POWDER-FREE SURGICAL GLOVE, POLYISOPRENE AND NEOPRENE BLEND: Brand: GAMMEX

## (undated) DEVICE — PROXIMATE SKIN STAPLERS (35 WIDE) CONTAINS 35 STAINLESS STEEL STAPLES (FIXED HEAD): Brand: PROXIMATE

## (undated) DEVICE — DRAIN INCS 7MM 20CMX7MM SIL

## (undated) DEVICE — Device: Brand: JELCO

## (undated) DEVICE — 3M™ IOBAN™ 2 ANTIMICROBIAL INCISE DRAPE 6650EZ: Brand: IOBAN™ 2

## (undated) DEVICE — SINGLE USE BIOPSY VALVE MAJ-210: Brand: SINGLE USE BIOPSY VALVE (STERILE)

## (undated) DEVICE — GOWN SURG AERO BLUE PERF XLG

## (undated) DEVICE — SUTURE VICRYL 2-0 JJ42G

## (undated) DEVICE — DRAIN CHEST DRY ADULT/PED

## (undated) DEVICE — CULTURE TUBE ANAEROBIC

## (undated) DEVICE — ABDOMINAL PAD: Brand: CURITY

## (undated) DEVICE — SOL  .9 1000ML BTL

## (undated) DEVICE — SUTURE VICRYL 2-0 CT-1

## (undated) DEVICE — SUTURE CHROMIC 2-0 801H

## (undated) DEVICE — SUCTION CANISTER, 3000CC,SAFELINER: Brand: DEROYAL

## (undated) NOTE — LETTER
[address]      Authorization for Surgical Operation and Procedure     Date:__9/27/2021_________                                                                                                         Kenn Long and/or blood products. The following are some, but not all, of the potential risks that can occur: fever and allergic reactions, hemolytic reactions, transmission of diseases such as Hepatitis, AIDS and Cytomegalovirus (CMV) and fluid overload.   In the ev authorized to consent on my behalf). The surgeon or my attending physician will determine when the applicable recovery period ends for purposes of reinstating the DNAR order.   10. Patients having a sterilization procedure: I understand that if the procedur disclosed this and had a discussion with my patient.     _______________________________________________________________ _____________________________  [facility] Chaseking of Physician)

## (undated) NOTE — LETTER
[address] [place] [address]      Authorization for Surgical Operation and Procedure     Date:___________                                                                                                         Time:_______ can occur: fever and allergic reactions, hemolytic reactions, transmission of diseases such as Hepatitis, AIDS and Cytomegalovirus (CMV) and fluid overload.   In the event that I wish to have an autologous transfusion of my own blood, or a directed donor tr the applicable recovery period ends for purposes of reinstating the DNAR order.   10. Patients having a sterilization procedure: I understand that if the procedure is successful the results will be permanent and it will therefore be impossible for me to ins _______________________________________________________________ _____________________________  [facility] Physician)                                                                                         (Date)                                   (Ti

## (undated) NOTE — LETTER
[address], [place]      Authorization for Surgical Operation and Procedure     Date:___________                                                                                                         Time:_______ can occur: fever and allergic reactions, hemolytic reactions, transmission of diseases such as Hepatitis, AIDS and Cytomegalovirus (CMV) and fluid overload.   In the event that I wish to have an autologous transfusion of my own blood, or a directed donor tr the applicable recovery period ends for purposes of reinstating the DNAR order.   10. Patients having a sterilization procedure: I understand that if the procedure is successful the results will be permanent and it will therefore be impossible for me to ins _______________________________________________________________ _____________________________  [facility] Physician)                                                                                         (Date)                                   (Ti

## (undated) NOTE — LETTER
[facility]  Administration of Anesthesia  1. I, Mariusz Mosley, or _________________________________ acting on her behalf, (Patient) (Dependent/Representative) request to receive anesthesia for my pending procedure/operation/treatment. bleeding, seizure, cardiac arrest and death. 7. AWARENESS: I understand that it is possible (but unlikely) to have explicit memory of events from the operating room while under general anesthesia.   8. ELECTROCONVULSIVE THERAPY PATIENTS: This consent serve below affirms that prior to the time of the procedure, I have explained to the patient and/or his/her guardian, the risks and benefits of undergoing anesthesia, as well as any reasonable alternatives.     ___________________________________________________